# Patient Record
Sex: FEMALE | Race: BLACK OR AFRICAN AMERICAN | NOT HISPANIC OR LATINO | Employment: FULL TIME | URBAN - METROPOLITAN AREA
[De-identification: names, ages, dates, MRNs, and addresses within clinical notes are randomized per-mention and may not be internally consistent; named-entity substitution may affect disease eponyms.]

---

## 2016-06-22 LAB — HPV HIGH RISK DNA PROBE: NEGATIVE

## 2017-11-27 ENCOUNTER — OFFICE VISIT (OUTPATIENT)
Dept: FAMILY MEDICINE | Facility: CLINIC | Age: 37
End: 2017-11-27
Payer: COMMERCIAL

## 2017-11-27 VITALS
HEIGHT: 63 IN | DIASTOLIC BLOOD PRESSURE: 69 MMHG | OXYGEN SATURATION: 100 % | WEIGHT: 141 LBS | BODY MASS INDEX: 24.98 KG/M2 | TEMPERATURE: 97.9 F | HEART RATE: 87 BPM | SYSTOLIC BLOOD PRESSURE: 106 MMHG

## 2017-11-27 DIAGNOSIS — M54.41 ACUTE BILATERAL LOW BACK PAIN WITH RIGHT-SIDED SCIATICA: Primary | ICD-10-CM

## 2017-11-27 DIAGNOSIS — Z12.4 SCREENING FOR MALIGNANT NEOPLASM OF CERVIX: ICD-10-CM

## 2017-11-27 DIAGNOSIS — M51.379 DEGENERATION OF LUMBAR OR LUMBOSACRAL INTERVERTEBRAL DISC: ICD-10-CM

## 2017-11-27 PROCEDURE — 99203 OFFICE O/P NEW LOW 30 MIN: CPT | Performed by: FAMILY MEDICINE

## 2017-11-27 RX ORDER — PREDNISONE 20 MG/1
TABLET ORAL
COMMUNITY
Start: 2017-11-20 | End: 2017-11-30

## 2017-11-27 RX ORDER — GABAPENTIN 100 MG/1
100 CAPSULE ORAL 3 TIMES DAILY
COMMUNITY
Start: 2017-11-20 | End: 2018-07-26

## 2017-11-27 ASSESSMENT — PAIN SCALES - GENERAL: PAINLEVEL: NO PAIN (0)

## 2017-11-27 NOTE — Clinical Note
Please abstract the following data from this visit with this patient into the appropriate field in Epic:  Pap smear and HPV testing done on this date: 6/22/16 (exact), by this group: [facility], results were normal.

## 2017-11-27 NOTE — PATIENT INSTRUCTIONS
At [facility], we strive to deliver an exceptional experience to you, every time we see you.  If you receive a survey in the mail, please send us back your thoughts. We really do value your feedback.    Based on your medical history, these are the current health maintenance/preventive care services that you are due for (some may have been done at this visit.)  Health Maintenance Due   Topic Date Due     TETANUS IMMUNIZATION (SYSTEM ASSIGNED)  01/20/1999     PAP Q1 YR  02/15/2011     INFLUENZA VACCINE (SYSTEM ASSIGNED)  09/01/2017         Suggested websites for health information:  Www.Fifteen Reasons.Guvera : Up to date and easily searchable information on multiple topics.  Www.ViSSee.gov : medication info, interactive tutorials, watch real surgeries online  Www.familydoctor.org : good info from the Academy of Family Physicians  Www.cdc.gov : public health info, travel advisories, epidemics (H1N1)  Www.aap.org : children's health info, normal development, vaccinations  Www.health.[facility].[place].us : [place] dept of health, public health issues in [place], N1N1    Your care team:                            Family Medicine Internal Medicine   MD Darell Cooper MD Shantel Branch-Fleming, MD Katya Georgiev PA-C Nam Ho, MD Pediatrics   YVONNE Henry, MORGAN Vera APRN CNP   MD Marya Maxwell MD Deborah Mielke, MD Kim Thein, APRN CNP      Clinic hours: Monday - Thursday 7 am-7 pm; Fridays 7 am-5 pm.   Urgent care: Monday - Friday 11 am-9 pm; Saturday and Sunday 9 am-5 pm.  Pharmacy : Monday -Thursday 8 am-8 pm; Friday 8 am-6 pm; Saturday and Sunday 9 am-5 pm.     Clinic: [phone]   Pharmacy: [phone]    Understanding Lumbar Radiculopathy    Lumbar radiculopathy is irritation or inflammation of a nerve root in the low back. It causes symptoms that spread out from the back down one or both legs. To understand this condition, it helps to  understand the parts of the spine:    Vertebrae. These are bones that stack to form the spine. The lumbar spine contains the 5 bottom vertebrae.    Disks. These are soft pads of tissue between the vertebrae. They act as shock absorbers for the spine.    Spinal canal. This is a tunnel formed within the stacked vertebrae. In the lumbar spine, nerves run through this canal.    Nerves. These branch off and leave the spinal canal, traveling out to parts of the body. As they leave the spinal canal, nerves pass through openings between the vertebrae. The nerve root is the part of the nerve that is closest to the spinal canal.    Sciatic nerve. This is a large nerve formed from several nerve roots in the low back. This nerve extends down the back of the leg to the foot.  With lumbar radiculopathy, nerve roots in the low back become irritated. This leads to pain and symptoms. The sciatic nerve is commonly involved, so the condition is often called sciatica.  What causes lumbar radiculopathy?  Aging, injury, poor posture, extra body weight, and other issues can lead to problems in the low back. These problems may then irritate nerve roots. They include:    Damage to a disk in the lumbar spine. The damaged disk may then press on nearby nerve roots.    Degeneration from wear and tear, and aging. This can lead to narrowing (stenosis) of the openings between the vertebrae. The narrowed openings press on nerve roots as they leave the spinal canal.    Unstable spine. This is when a vertebra slips forward. It can then press on a nerve root.  Other, less common things can put pressure on nerves in the low back. These include diabetes, infection, or a tumor.  Symptoms of lumbar radiculopathy  These include:    Pain in the low back    Pain, numbness, tingling, or weakness that travels into the buttocks, hip, groin, or leg    Muscle spasms  Treatment for lumbar radiculopathy  In most cases, your healthcare provider will first try  treatments that help relieve symptoms. These may include:    Prescription and over-the-counter pain medicines. These help relieve pain, swelling, and irritation.    Limits on positions and activities that increase pain. But lying in bed or avoiding all movement is only recommended for a short period of time.    Physical therapy, including exercises and stretches. This helps decrease pain and increase movement and function.    Steroid shots into the lower back. This may help relieve symptoms for a time.    Weight-loss program. If you are overweight, losing extra pounds may help relieve symptoms.  In some cases, you may need surgery to fix the underlying problem. This depends on the cause, the symptoms, and how long the pain has lasted.  Possible complications  Over time, an irritated and inflamed nerve may become damaged. This may lead to long-lasting (permanent) numbness or weakness in your legs and feet. If symptoms change suddenly or get worse, be sure to let your healthcare provider know.  When to call your healthcare provider  Call your healthcare provider right away if you have any of these:    New pain or pain that gets worse    New or increasing weakness, tingling, or numbness in your leg or foot    Problems controlling your bladder or bowel   Date Last Reviewed: 3/10/2016    0368-3981 [employer]. [address] [place] [address], [place] [address]. All rights reserved. This information is not intended as a substitute for professional medical care. Always follow your healthcare professional's instructions.

## 2017-11-27 NOTE — MR AVS SNAPSHOT
After Visit Summary   11/27/2017    Jorge Mahajan    MRN: [id number]           Patient Information     Date Of Birth          1980        Visit Information        Provider Department      11/27/2017 1:40 PM Bubba Rivera MD [facility]        Today's Diagnoses     Acute bilateral low back pain with right-sided sciatica    -  1    Degeneration of lumbar or lumbosacral intervertebral disc          Care Instructions    At [facility], we strive to deliver an exceptional experience to you, every time we see you.  If you receive a survey in the mail, please send us back your thoughts. We really do value your feedback.    Based on your medical history, these are the current health maintenance/preventive care services that you are due for (some may have been done at this visit.)  Health Maintenance Due   Topic Date Due     TETANUS IMMUNIZATION (SYSTEM ASSIGNED)  01/20/1999     PAP Q1 YR  02/15/2011     INFLUENZA VACCINE (SYSTEM ASSIGNED)  09/01/2017         Suggested websites for health information:  Www.BioCision : Up to date and easily searchable information on multiple topics.  Www.medlineplus.gov : medication info, interactive tutorials, watch real surgeries online  Www.familydoctor.org : good info from the Academy of Family Physicians  Www.cdc.gov : public health info, travel advisories, epidemics (H1N1)  Www.aap.org : children's health info, normal development, vaccinations  Www.health.state[place]us : [place] dept of health, public health issues in [place], N1N1    Your care team:                            Family Medicine Internal Medicine   MD Darell Cooper MD Shantel Branch-Fleming, MD Katya Georgiev PA-C Nam Ho, MD Pediatrics   YVONNE Henry, MD Marya Thomas CNP, MD Deborah Mielke, MD Kim Thein, APRN CNP      Clinic hours: Monday - Thursday 7 am-7 pm; Fridays 7 am-5  pm.   Urgent care: Monday - Friday 11 am-9 pm; Saturday and Sunday 9 am-5 pm.  Pharmacy : Monday -Thursday 8 am-8 pm; Friday 8 am-6 pm; Saturday and Sunday 9 am-5 pm.     Clinic: [phone]   Pharmacy: [phone]            Follow-ups after your visit        Additional Services     JO PT, HAND, AND CHIROPRACTIC REFERRAL       **This order will print in the [facility] Scheduling Office**    Physical Therapy, Hand Therapy and Chiropractic Care are available through:    *[facility]  *[facility]  *[facility]    Call one number to schedule at any of the above locations: [phone].    (Go to [email or web address] for a list of locations with addresses and office hours.)    Your provider has referred you to: Physical Therapy at [facility] or [facility]    Indication/Reason for Referral: Low Back Pain with radicular pain  Onset of Illness: February 2017  Therapy Orders: Evaluate and Treat  Special Programs: None  Special Request: Equipment: As Indicated:   Special Request: Modalities: As Indicated:     Additional Comments for the Therapist or Chiropractor: L5 S1 herniated disc    Please be aware that coverage of these services is subject to the terms and limitations of your health insurance plan.  Call member services at your health plan with any benefit or coverage questions.      Please bring the following to your appointment:    *Your personal calendar for scheduling future appointments  *Comfortable clothing            ORTHO  REFERRAL       [facility] is referring you to the Orthopedic  Services at [facility].       The  Representative will assist you in the coordination of your Orthopedic and Musculoskeletal Care as prescribed by your physician.    The  Representative will call you within 1 business day to help schedule your appointment, or you may contact the  Representative  at:    All areas ~ [phone]     Type of Referral : Spine: Lumbar  **Choose Medical Spine Specialist (unless patient was seen by a Medical Spine Specialist within the past 6 months).**  Surgical Evaluation is advised if the patient presents with one or more of the following red flags: Evidence of Spinal Tumor, Infection or Fracture, Cauda Equina Syndrome, Sudden or Progressive Weakness, Loss of Bowel or Bladder Control, or any other documented emergent neurological condition resulting from a Lumbar Spinal Condition. Medical Spine Specialist        Timeframe requested: Within 2 weeks    Coverage of these services is subject to the terms and limitations of your health insurance plan.  Please call member services at your health plan with any benefit or coverage questions.      If X-rays, CT or MRI's have been performed, please contact the facility where they were done to arrange for , prior to your scheduled appointment.  Please bring this referral request to your appointment and present it to your specialist.                  Who to contact     If you have questions or need follow up information about today's clinic visit or your schedule please contact [facility] directly at [phone].  Normal or non-critical lab and imaging results will be communicated to you by [employer], letter or phone within 4 business days after the clinic has received the results. If you do not hear from us within 7 days, please contact the clinic through [employer] or phone. If you have a critical or abnormal lab result, we will notify you by phone as soon as possible.  Submit refill requests through [employer] or call your pharmacy and they will forward the refill request to us. Please allow 3 business days for your refill to be completed.          Additional Information About Your Visit        [employer] Information     [employer] lets you send messages to your doctor, view your test results, renew your prescriptions,  "schedule appointments and more. To sign up, go to [email or web address] . Click on \"Log in\" on the left side of the screen, which will take you to the Welcome page. Then click on \"Sign up Now\" on the right side of the page.     You will be asked to enter the access code listed below, as well as some personal information. Please follow the directions to create your username and password.     Your access code is: [email or web address]  Expires: 2018  2:26 PM     Your access code will  in 90 days. If you need help or a new code, please call your [place] clinic or [phone].        Care EveryWhere ID     This is your Care EveryWhere ID. This could be used by other organizations to access your [place] medical records  [id number]        Your Vitals Were     Pulse Temperature Height Pulse Oximetry BMI (Body Mass Index)       87 97.9  F (36.6  C) (Oral) 1.6 m (5' 3\") 100% 24.98 kg/m2        Blood Pressure from Last 3 Encounters:   17 106/69   02/15/10 98/60    Weight from Last 3 Encounters:   17 64 kg (141 lb)   02/15/10 68 kg (150 lb)              We Performed the Following     JO PT, HAND, AND CHIROPRACTIC REFERRAL     ORTHO  REFERRAL        Primary Care Provider Fax #    Provider Not In System [phone]                Equal Access to Services     [facility]: Hadii aad ku hadasho Soomaali, waaxda luqadaha, qaybta kaalmada adeegyada, waxay james tolbert . So [facility] [phone].    ATENCIÓN: Si habla español, tiene a lincoln disposición servicios gratuitos de asistencia lingüística. Llame al [phone].    We comply with applicable federal civil rights laws and [place] laws. We do not discriminate on the basis of race, color, national origin, age, disability, sex, sexual orientation, or gender identity.            Thank you!     Thank you for choosing [facility]  for your care. Our goal is always to provide you with excellent care. Hearing back " from our patients is one way we can continue to improve our services. Please take a few minutes to complete the written survey that you may receive in the mail after your visit with us. Thank you!             Your Updated Medication List - Protect others around you: Learn how to safely use, store and throw away your medicines at www.disposemymeds.org.          This list is accurate as of: 11/27/17  2:29 PM.  Always use your most recent med list.                   Brand Name Dispense Instructions for use Diagnosis    gabapentin 100 MG capsule    NEURONTIN     Take 100 mg by mouth 3 times daily for nerve pain (sciatica in leg)        predniSONE 20 MG tablet    DELTASONE     Take 2 tabs daily x 3 days, then 1 tab daily x 3 days, then 1/2 tab daily x 4 days.

## 2017-11-27 NOTE — PROGRESS NOTES
"  SUBJECTIVE:   Jorge Mahajan is a 37 year old female who presents to clinic today for the following health issues:      Back Pain       Duration: since February 2017, since she had a baby        Specific cause: none    Description:   Location of pain: low back middle  Character of pain: sharp  Pain radiation:radiates into the right & hip leg  New numbness or weakness in legs, not attributed to pain:  No, but she mentions sometimes she feels her leg is going to \"give up on her\"  - intermittent    Intensity: Currently 0/10    History:   Pain interferes with job: YES  History of back problems: previous herniated disc  Any previous MRI or X-rays: Yes--x-ray (August 2017) and MRI (11/20/2017) at [facility] [place] [facility].  Report visible on Care Everywhere.  Sees a specialist for back pain:  No  Therapies tried without relief: Pain medication (taking gabapentin - she thinks that this medication is causing a headache side effect, however) and prednisone (which she recently finished)    Alleviating factors:   Improved by: heat      Precipitating factors:  Worsened by: Standing    Functional and Psychosocial Screen (Alesia STarT Back):      Not performed today          Accompanying Signs & Symptoms:  Risk of Fracture:  None  Risk of Cauda Equina:  None  Risk of Infection:  None  Risk of Cancer:  None  Risk of Ankylosing Spondylitis:  Onset at age <35, male, AND morning back stiffness. no         Past medical, family, and social histories, medications, and allergies are reviewed and updated in Epic.     ROS:  C: NEGATIVE for fever, chills, change in weight  E/M: NEGATIVE for ear, mouth and throat problems  R: NEGATIVE for significant cough or SOB  CV: NEGATIVE for chest pain, palpitations or peripheral edema  ROS otherwise negative    This document serves as a record of the services and decisions personally performed and made by Dr. Rivera. It was created on his behalf by Maura Núñez, a trained medical scribe. The creation of this " "document is based the provider's statements to the medical scribe.  Maura Núñez November 27, 2017 1:58 PM     OBJECTIVE:                                                    /69 (BP Location: Left arm, Patient Position: Chair, Cuff Size: Adult Regular)  Pulse 87  Temp 97.9  F (36.6  C) (Oral)  Ht 1.6 m (5' 3\")  Wt 64 kg (141 lb)  SpO2 100%  BMI 24.98 kg/m2   Body mass index is 24.98 kg/(m^2).     GENERAL: healthy, alert and no distress  EYES: Eyes grossly normal to inspection, PERRL, EOMI, sclerae white and conjunctivae normal  MS: no gross musculoskeletal defects noted, no edema  SKIN: no suspicious lesions or rashes  NEURO: Normal strength and tone, sensory exam grossly normal, mentation intact, oriented times 3 and cranial nerves 2-12 intact  PSYCH: mentation appears normal, affect normal/bright     Diagnostic Test Results:  none      ASSESSMENT/PLAN:                                                      (M54.41) Acute bilateral low back pain with right-sided sciatica  (primary encounter diagnosis)  (M51.37) Degeneration of lumbar or lumbosacral intervertebral disc  Comment: S1 radicular symptoms, described as right-sided although her MRI is more abnormal on the left side.   Plan: JO PT, HAND, AND CHIROPRACTIC REFERRAL, ORTHO          REFERRAL        Handouts provided. I instructed her to take gabapentin 300 mg QHS rather than 100 mg BID or TID.        The information in this document, created by the medical scribe for me, accurately reflects the services I personally performed and the decisions made by me. I have reviewed and approved this document for accuracy prior to leaving the patient care area. November 27, 2017 1:58 PM   Bubba Rivera MD     "

## 2017-11-27 NOTE — NURSING NOTE
"Chief Complaint   Patient presents with     Back Pain       Initial /69 (BP Location: Left arm, Patient Position: Chair, Cuff Size: Adult Regular)  Pulse 87  Temp 97.9  F (36.6  C) (Oral)  Ht 5' 3\" (1.6 m)  Wt 141 lb (64 kg)  SpO2 100%  BMI 24.98 kg/m2 Estimated body mass index is 24.98 kg/(m^2) as calculated from the following:    Height as of this encounter: 5' 3\" (1.6 m).    Weight as of this encounter: 141 lb (64 kg).  Medication Reconciliation: complete   CHRISTOPH Mascorro MA      "

## 2017-11-30 ENCOUNTER — THERAPY VISIT (OUTPATIENT)
Dept: PHYSICAL THERAPY | Facility: CLINIC | Age: 37
End: 2017-11-30
Payer: COMMERCIAL

## 2017-11-30 DIAGNOSIS — M54.41 ACUTE LOW BACK PAIN WITH RIGHT-SIDED SCIATICA: Primary | ICD-10-CM

## 2017-11-30 DIAGNOSIS — M51.379 DEGENERATION OF LUMBAR OR LUMBOSACRAL INTERVERTEBRAL DISC: ICD-10-CM

## 2017-11-30 PROCEDURE — 97112 NEUROMUSCULAR REEDUCATION: CPT | Mod: GP | Performed by: PHYSICAL THERAPIST

## 2017-11-30 PROCEDURE — 97110 THERAPEUTIC EXERCISES: CPT | Mod: GP | Performed by: PHYSICAL THERAPIST

## 2017-11-30 PROCEDURE — 97161 PT EVAL LOW COMPLEX 20 MIN: CPT | Mod: GP | Performed by: PHYSICAL THERAPIST

## 2017-11-30 NOTE — MR AVS SNAPSHOT
"              After Visit Summary   11/30/2017    Jorge Mahajan    MRN: [id number]           Patient Information     Date Of Birth          1980        Visit Information        Provider Department      11/30/2017 5:20 PM Chevy Levi PT [facility]        Today's Diagnoses     Acute low back pain with right-sided sciatica    -  1    Degeneration of lumbar or lumbosacral intervertebral disc           Follow-ups after your visit        Your next 10 appointments already scheduled     Dec 11, 2017 11:00 AM CST   MEDSPINE NEW with Jaren Farrar,    [facility] ([facility])    [address] [address] [place] [id number] [phone]              Who to contact     If you have questions or need follow up information about today's clinic visit or your schedule please contact [place] [facility] directly at [phone].  Normal or non-critical lab and imaging results will be communicated to you by MyChart, letter or phone within 4 business days after the clinic has received the results. If you do not hear from us within 7 days, please contact the clinic through [employer] or phone. If you have a critical or abnormal lab result, we will notify you by phone as soon as possible.  Submit refill requests through [employer] or call your pharmacy and they will forward the refill request to us. Please allow 3 business days for your refill to be completed.          Additional Information About Your Visit        [employer] Information     [facility] lets you send messages to your doctor, view your test results, renew your prescriptions, schedule appointments and more. To sign up, go to [email or web address] . Click on \"Log in\" on the left side of the screen, which will take you to the Welcome page. Then click on \"Sign up Now\" on the right side of the page.     You will be asked to enter the access code listed below, as well as " some personal information. Please follow the directions to create your username and password.     Your access code is: [email or web address]  Expires: 2018  2:26 PM     Your access code will  in 90 days. If you need help or a new code, please call your [place] clinic or [phone].        Care EveryWhere ID     This is your Care EveryWhere ID. This could be used by other organizations to access your [place] medical records  [id number]         Blood Pressure from Last 3 Encounters:   17 106/69   02/15/10 98/60    Weight from Last 3 Encounters:   17 64 kg (141 lb)   02/15/10 68 kg (150 lb)              We Performed the Following     JO Inital Eval Report     Neuromuscular Re-Education     PT Eval, Low Complexity ([id number])     Therapeutic Exercises        Primary Care Provider Fax #    Provider Not In System [phone]                Equal Access to Services     [facility]: Hadii aad ku hadasho Solisa, waaxda luqadaha, qaybta kaalmada adeegyada, waxay james tolbert . So [facility] [phone].    ATENCIÓN: Si habla español, tiene a lincoln disposición servicios gratuitos de asistencia lingüística. Llame al [phone].    We comply with applicable federal civil rights laws and [place] laws. We do not discriminate on the basis of race, color, national origin, age, disability, sex, sexual orientation, or gender identity.            Thank you!     Thank you for choosing [facility]  for your care. Our goal is always to provide you with excellent care. Hearing back from our patients is one way we can continue to improve our services. Please take a few minutes to complete the written survey that you may receive in the mail after your visit with us. Thank you!             Your Updated Medication List - Protect others around you: Learn how to safely use, store and throw away your medicines at www.disposemymeds.org.          This list is accurate as of:  11/30/17  6:47 PM.  Always use your most recent med list.                   Brand Name Dispense Instructions for use Diagnosis    gabapentin 100 MG capsule    NEURONTIN     Take 100 mg by mouth 3 times daily for nerve pain (sciatica in leg)        predniSONE 20 MG tablet    DELTASONE     Take 2 tabs daily x 3 days, then 1 tab daily x 3 days, then 1/2 tab daily x 4 days.

## 2017-11-30 NOTE — PROGRESS NOTES
[facility] Initial Evaluation      Subjective:    Patient is a 37 year old female presenting with rehab back hpi.   Jorge Mahajan is a 37 year old female with a lumbar condition.  Condition occurred with:  Insidious onset.  Condition occurred: for unknown reasons.  This is a chronic and recurrent condition  February 2017.    Patient reports pain:  Lumbar spine right and lumbar spine left.  Radiates to:  Gluteals right, gluteals left and thigh right.  Pain is described as stabbing and is intermittent and reported as 10/10.  Associated symptoms:  Loss of strength. Pain is the same all the time.  Symptoms are exacerbated by bending, standing and sitting and relieved by heat and analgesics (walking).  Since onset symptoms are gradually worsening.  Special tests:  MRI and x-ray (lumbar HNP).  Previous treatment: NONE.    General health as reported by patient is excellent.  Pertinent medical history includes:  None.  Medical allergies: no.  Other surgeries include:  Other.  Current medications:  Pain medication.  Current occupation is NURSING ASSIST. .  Patient is working in normal job without restrictions.  Primary job tasks include:  Prolonged standing and lifting.    Barriers include:  None as reported by the patient.    Red flags:  None as reported by the patient.                        Objective:    System         Lumbar/SI Evaluation    Lumbar Myotomes:    T12-L3 (Hip Flex):  Left: 5    Right: 5  L2-4 (Quads):  Left:  5    Right:  5  L4 (Ankle DF):  Left:  5    Right:  5  L5 (Great Toe Ext): Left: 5    Right: 5   S1 (Toe Raise):  Left: 5    Right: 4+  Lumbar DTR's:    L4 (Quad):  Left:  1   Right:  1  S1 (Achilles):  Left:  0   Right:  0                                                               [place] Lumbar Evaluation    Posture:  Sitting: fair        Correction of Posture: better  Other Observations: EIS AND DEANNA: NO PAIN.   Movement Loss:  Flexion (Flex): pain  Extension (EXT):  nil  Side Glide R (SG R): nil  Side Glide L (SG L): nil  Test Movements:  FIS: During: increases                  Conclusion: derangement  Principle of Treatment:  Posture Correction: IN SITTING WITH TOWEL ROLL    Extension: EIS       Other: NEUTRAL SPINE, THER EX, THER ACT, NMR, MANUAL THERAPY.                                        ROS    Assessment/Plan:      Patient is a 37 year old female with lumbar complaints.    Patient has the following significant findings with corresponding treatment plan.                Diagnosis 1:  ACUTE LOW BACK PAIN with R SCIATICA,  DEGENERATION OF LUMBAR OR LUMBOSACRAL INTERVERTEBRAL DISC.   Pain -  hot/cold therapy, US, electric stimulation, manual therapy, splint/taping/bracing/orthotics, self management, education, directional preference exercise and home program  Decreased function - therapeutic activities and home program  Impaired posture - neuro re-education, therapeutic activities and home program    Therapy Evaluation Codes:   1) History comprised of:   Personal factors that impact the plan of care:      Past/current experiences.    Comorbidity factors that impact the plan of care are:      None.     Medications impacting care: None.  2) Examination of Body Systems comprised of:   Body structures and functions that impact the plan of care:      Lumbar spine.   Activity limitations that impact the plan of care are:      Bathing, Bending, Driving, Dressing, Lifting, Sitting, Squatting/kneeling, Stairs, Standing and Working.  3) Clinical presentation characteristics are:   Stable/Uncomplicated.  4) Decision-Making    Low complexity using standardized patient assessment instrument and/or measureable assessment of functional outcome.  Cumulative Therapy Evaluation is: Low complexity.    Previous and current functional limitations:  (See Goal Flow Sheet for this information)    Short term and Long term goals: (See Goal Flow Sheet for this information)     Communication ability:   Patient appears to be able to clearly communicate and understand verbal and written communication and follow directions correctly.  Treatment Explanation - The following has been discussed with the patient:   RX ordered/plan of care  Anticipated outcomes  Possible risks and side effects  This patient would benefit from PT intervention to resume normal activities.   Rehab potential is good.    Frequency:  1 X week, once daily  Duration:  for 6 weeks  Discharge Plan:  Achieve all LTG.  Independent in home treatment program.  Reach maximal therapeutic benefit.    Please refer to the daily flowsheet for treatment today, total treatment time and time spent performing 1:1 timed codes.

## 2017-12-11 ENCOUNTER — OFFICE VISIT (OUTPATIENT)
Dept: ORTHOPEDICS | Facility: CLINIC | Age: 37
End: 2017-12-11
Payer: COMMERCIAL

## 2017-12-11 VITALS
HEIGHT: 63 IN | HEART RATE: 85 BPM | OXYGEN SATURATION: 98 % | WEIGHT: 147.6 LBS | SYSTOLIC BLOOD PRESSURE: 98 MMHG | BODY MASS INDEX: 26.15 KG/M2 | DIASTOLIC BLOOD PRESSURE: 69 MMHG

## 2017-12-11 DIAGNOSIS — M54.16 LUMBAR RADICULOPATHY: Primary | ICD-10-CM

## 2017-12-11 PROCEDURE — 99213 OFFICE O/P EST LOW 20 MIN: CPT | Performed by: FAMILY MEDICINE

## 2017-12-11 RX ORDER — GABAPENTIN 100 MG/1
100 CAPSULE ORAL 3 TIMES DAILY
Qty: 90 CAPSULE | Refills: 1 | Status: SHIPPED | OUTPATIENT
Start: 2017-12-11 | End: 2018-07-26

## 2017-12-11 RX ORDER — GABAPENTIN 300 MG/1
300 CAPSULE ORAL 3 TIMES DAILY
Qty: 90 CAPSULE | Refills: 1 | Status: SHIPPED | OUTPATIENT
Start: 2017-12-11 | End: 2018-02-02

## 2017-12-11 ASSESSMENT — PAIN SCALES - GENERAL: PAINLEVEL: NO PAIN (0)

## 2017-12-11 NOTE — PATIENT INSTRUCTIONS
Thanks for coming today.  Ortho/Sports Medicine Clinic  [address] [place] [address]    To schedule future appointments in Ortho Clinic, you may call [phone].    To schedule ordered imaging by your provider:   Call Central Imaging [phone]    To schedule an injection ordered by your provider:  Call Central Imaging Injection scheduling line: [phone]  OmnyPayhart available online at:  [email or web address]    Please call if any further questions or concerns ([phone]).  Clinic hours 8 am to 5 pm.    Return to clinic (call) if symptoms worsen or fail to improve.

## 2017-12-11 NOTE — MR AVS SNAPSHOT
After Visit Summary   2017    Jorge Mahajan    MRN: [id number]           Patient Information     Date Of Birth          1980        Visit Information        Provider Department      2017 11:00 AM Jaren Farrar, DO [facility]        Today's Diagnoses     Lumbar radiculopathy    -  1      Care Instructions    Thanks for coming today.  Ortho[facility]Sports Medicine Clinic  [address] [place] [address]    To schedule future appointments in Ortho Clinic, you may call [phone].    To schedule ordered imaging by your provider:   Call Central Imaging [phone]    To schedule an injection ordered by your provider:  Call Central Imaging Injection scheduling line: [phone]  [employer] available online at:  [email or web address]    Please call if any further questions or concerns ([phone]).  Clinic hours 8 am to 5 pm.    Return to clinic (call) if symptoms worsen or fail to improve.            Follow-ups after your visit        Who to contact     If you have questions or need follow up information about today's clinic visit or your schedule please contact [facility] directly at [phone].  Normal or non-critical lab and imaging results will be communicated to you by Eventbritehart, letter or phone within 4 business days after the clinic has received the results. If you do not hear from us within 7 days, please contact the clinic through Eventbritehart or phone. If you have a critical or abnormal lab result, we will notify you by phone as soon as possible.  Submit refill requests through [employer] or call your pharmacy and they will forward the refill request to us. Please allow 3 business days for your refill to be completed.          Additional Information About Your Visit        MyCharEnStorage Information     [employer] is an electronic gateway that provides easy, online access to your medical records. With [employer], you can request a clinic  "appointment, read your test results, renew a prescription or communicate with your care team.     To sign up for [employer] visit the website at [email or web address]   You will be asked to enter the access code listed below, as well as some personal information. Please follow the directions to create your username and password.     Your access code is: [email or web address]  Expires: 2018  2:26 PM     Your access code will  in 90 days. If you need help or a new code, please contact your [facility] or call [phone] for assistance.        Care EveryWhere ID     This is your Care EveryWhere ID. This could be used by other organizations to access your [facility] medical records  [id number]        Your Vitals Were     Pulse Height Pulse Oximetry BMI (Body Mass Index)          85 1.6 m (5' 2.99\") 98% 26.15 kg/m2         Blood Pressure from Last 3 Encounters:   17 98/69   17 106/69   02/15/10 98/60    Weight from Last 3 Encounters:   17 67 kg (147 lb 9.6 oz)   17 64 kg (141 lb)   02/15/10 68 kg (150 lb)              Today, you had the following     No orders found for display         Today's Medication Changes          These changes are accurate as of: 17 11:53 AM.  If you have any questions, ask your nurse or doctor.               These medicines have changed or have updated prescriptions.        Dose/Directions    * gabapentin 100 MG capsule   Commonly known as:  NEURONTIN   This may have changed:  Another medication with the same name was added. Make sure you understand how and when to take each.   Changed by:  Bubba Rivera MD        Dose:  100 mg   Take 100 mg by mouth 3 times daily for nerve pain (sciatica in leg)   Refills:  0       * gabapentin 300 MG capsule   Commonly known as:  NEURONTIN   This may have changed:  You were already taking a medication with the same name, and this prescription was added. Make sure you understand how and " when to take each.   Used for:  Lumbar radiculopathy   Changed by:  Jaren Farrar DO        Dose:  300 mg   Take 1 capsule (300 mg) by mouth 3 times daily   Quantity:  90 capsule   Refills:  1       * gabapentin 100 MG capsule   Commonly known as:  NEURONTIN   This may have changed:  You were already taking a medication with the same name, and this prescription was added. Make sure you understand how and when to take each.   Used for:  Lumbar radiculopathy   Changed by:  Jaren Farrar DO        Dose:  100 mg   Take 1 capsule (100 mg) by mouth 3 times daily   Quantity:  90 capsule   Refills:  1       * Notice:  This list has 3 medication(s) that are the same as other medications prescribed for you. Read the directions carefully, and ask your doctor or other care provider to review them with you.         Where to get your medicines      These medications were sent to [facility] [id number] [place] [address] [address] [place] [address]  [address] [place] [phone]    Hours:  24-hours Phone:  [phone]     gabapentin 100 MG capsule    gabapentin 300 MG capsule                Primary Care Provider Fax #    Provider Not In System [phone]                Equal Access to Services     ANDREA BARAHONA : Frida lundbergo Solisa, waaxda luqadaha, qaybta kaalmada adeegyada, anali lopez. So [facility] [place] [phone].    ATENCIÓN: Si habla español, tiene a lincoln disposición servicios gratuitos de asistencia lingüística. Llame al [phone].    We comply with applicable federal civil rights laws and [place] laws. We do not discriminate on the basis of race, color, national origin, age, disability, sex, sexual orientation, or gender identity.            Thank you!     Thank you for choosing [facility]  for your care. Our goal is always to provide you with excellent care. Hearing back from our patients is one way we can continue  to improve our services. Please take a few minutes to complete the written survey that you may receive in the mail after your visit with us. Thank you!             Your Updated Medication List - Protect others around you: Learn how to safely use, store and throw away your medicines at www.disposemymeds.org.          This list is accurate as of: 12/11/17 11:53 AM.  Always use your most recent med list.                   Brand Name Dispense Instructions for use Diagnosis    * gabapentin 100 MG capsule    NEURONTIN     Take 100 mg by mouth 3 times daily for nerve pain (sciatica in leg)        * gabapentin 300 MG capsule    NEURONTIN    90 capsule    Take 1 capsule (300 mg) by mouth 3 times daily    Lumbar radiculopathy       * gabapentin 100 MG capsule    NEURONTIN    90 capsule    Take 1 capsule (100 mg) by mouth 3 times daily    Lumbar radiculopathy       * Notice:  This list has 3 medication(s) that are the same as other medications prescribed for you. Read the directions carefully, and ask your doctor or other care provider to review them with you.

## 2017-12-11 NOTE — NURSING NOTE
"Jorge Mahajan's goals for this visit include: evaluate lower back pain   She requests these members of her care team be copied on today's visit information: no    PCP: System, Provider Not In    Referring Provider:  No referring provider defined for this encounter.    Chief Complaint   Patient presents with     Consult     lower back pain X 8 months        Initial Pulse 85  Ht 1.6 m (5' 2.99\")  Wt 67 kg (147 lb 9.6 oz)  SpO2 98%  BMI 26.15 kg/m2 Estimated body mass index is 26.15 kg/(m^2) as calculated from the following:    Height as of this encounter: 1.6 m (5' 2.99\").    Weight as of this encounter: 67 kg (147 lb 9.6 oz).  Medication Reconciliation: complete    "

## 2017-12-11 NOTE — PROGRESS NOTES
CHIEF COMPLAINT:  Consult (lower back pain X 8 months )       HISTORY OF PRESENT ILLNESS  Ms. Mahajan is a pleasant 37 year old year old female who presents to clinic today with low back pain.  Jorge is seen at the request of Dr. Rivera.    Jorge is experiencing a pain in her low back this started shortly after the birth of her most recent child.  She had an uncomplicated  delivery.  Pain starts near the center of her low back, radiates down the right side of her low back down her right leg.  She does note some numbness and tingling associated with the pain.  She was seen at [facility] and had imaging of her lumbar spine, she was prescribed physical therapy which she just started.  She is feeling slightly better after PT.    She does have a history of low back pain, this was years ago and did not include radicular features.  This resolved spontaneously.      Additional history: as documented    MEDICAL HISTORY  Patient Active Problem List   Diagnosis     Cervical cancer screening     Acute low back pain with right-sided sciatica     Degeneration of lumbar or lumbosacral intervertebral disc       Current Outpatient Prescriptions   Medication Sig Dispense Refill     gabapentin (NEURONTIN) 100 MG capsule Take 100 mg by mouth 3 times daily for nerve pain (sciatica in leg)         No Known Allergies    Family History   Problem Relation Age of Onset     DIABETES Father      TYPE II     Family History Negative Other        Additional medical/Social/Surgical histories reviewed in [facility] and updated as appropriate.     REVIEW OF SYSTEMS (2017)  CONSTITUTIONAL: Denies fever and weight loss  EYES: Denies acute vision changes  ENT: Denies hearing changes or difficulty swallowing  CARDIAC: Denies chest pain or edema  RESPIRATORY: Denies dyspnea, cough or wheeze  GASTROINTESTINAL: Denies abdominal pain, nausea, vomiting  MUSCULOSKELETAL: See HPI  SKIN: Denies any recent rash or lesion  NEUROLOGICAL: Denies  "numbness or focal weakness  PSYCHIATRIC: No history of psychiatric symptoms or problems  HEMATOLOGY: Denies episodes of easy bleeding      PHYSICAL EXAM  Vitals:    12/11/17 1114   BP: 98/69   Pulse: 85   SpO2: 98%   Weight: 67 kg (147 lb 9.6 oz)   Height: 1.6 m (5' 2.99\")     General  - normal appearance, in no obvious distress  CV  - normal peripheral perfusion  Pulm  - normal respiratory pattern, non-labored  Musculoskeletal - lumbar spine  - stance: normal gait without limp, no obvious leg length discrepancy, normal heel and toe walk  - inspection: normal bone and joint alignment, no obvious scoliosis  - palpation: no paravertebral or bony tenderness  - ROM: normal flexion, extension, sidebending, rotation  - strength: lower extremities 5/5 in all planes  - special tests:  (+) slump test on R  Neuro  - patellar and Achilles DTRs 2+ bilaterally, grossly normal coordination, normal muscle tone  Skin  - no ecchymosis, erythema, warmth, or induration, no obvious rash  Psych  - interactive, appropriate, normal mood and affect         ASSESSMENT & PLAN  Ms. Mahajan is a 37 year old year old female who presents to clinic today with low back pain with radicular features.    The MRI that she had done a few weeks ago was pushed to our system, we reviewed the images in the room.  This does show an isolated L5 central disc protrusion with bilateral neural foraminal stenosis and a potential left-sided S1 nerve root impingement.    Jorge and I had a good discussion regarding non-operative treatment for degenerative disc disease.  This included strengthening of the paraspinal and core muscles, weight control, and oral analgesics when needed.  We also discussed the role of epidural corticosteroid injections.    Given her relative improvement with physical therapy I do think it is reasonable to continue PT.  I am hoping for a decent amount of improvement over the next 4-6 weeks.  We should follow-up at that time, if worsening or " if still painful we could consider referring her for a lumbar injection.    Thank you for allowing me to participate in Dena's care.    Jaren Farrar DO, CAQSM  Primary Care Sports Medicine

## 2018-01-05 ENCOUNTER — THERAPY VISIT (OUTPATIENT)
Dept: PHYSICAL THERAPY | Facility: CLINIC | Age: 38
End: 2018-01-05
Payer: COMMERCIAL

## 2018-01-05 DIAGNOSIS — M51.379 DEGENERATION OF LUMBAR OR LUMBOSACRAL INTERVERTEBRAL DISC: ICD-10-CM

## 2018-01-05 DIAGNOSIS — M54.41 ACUTE LOW BACK PAIN WITH RIGHT-SIDED SCIATICA: ICD-10-CM

## 2018-01-05 PROCEDURE — 97530 THERAPEUTIC ACTIVITIES: CPT | Mod: GP | Performed by: PHYSICAL THERAPIST

## 2018-01-05 PROCEDURE — 97110 THERAPEUTIC EXERCISES: CPT | Mod: GP | Performed by: PHYSICAL THERAPIST

## 2018-01-05 PROCEDURE — 97112 NEUROMUSCULAR REEDUCATION: CPT | Mod: GP | Performed by: PHYSICAL THERAPIST

## 2018-01-05 NOTE — MR AVS SNAPSHOT
"              After Visit Summary   1/5/2018    Jorge Mahajan    MRN: [id number]           Patient Information     Date Of Birth          1980        Visit Information        Provider Department      1/5/2018 7:00 AM Chevy Levi, PT [facility]        Today's Diagnoses     Acute low back pain with right-sided sciatica        Degeneration of lumbar or lumbosacral intervertebral disc           Follow-ups after your visit        Your next 10 appointments already scheduled     Jan 08, 2018  4:20 PM [facility] with Chevy Levi PT   [facility] ([facility]  )    [address]  [facility] [id number] [phone]              Who to contact     If you have questions or need follow up information about today's clinic visit or your schedule please contact [facility] directly at [phone].  Normal or non-critical lab and imaging results will be communicated to you by DocuSpeakhart, letter or phone within 4 business days after the clinic has received the results. If you do not hear from us within 7 days, please contact the clinic through DocuSpeakhart or phone. If you have a critical or abnormal lab result, we will notify you by phone as soon as possible.  Submit refill requests through Inporia or call your pharmacy and they will forward the refill request to us. Please allow 3 business days for your refill to be completed.          Additional Information About Your Visit        DocuSpeakhart Information     Inporia lets you send messages to your doctor, view your test results, renew your prescriptions, schedule appointments and more. To sign up, go to [email or web address] . Click on \"Log in\" on the left side of the screen, which will take you to the Welcome page. Then click on \"Sign up Now\" on the right side of the page.     You will be asked to enter the access code listed below, as well as some " personal information. Please follow the directions to create your username and password.     Your access code is: [email or web address]  Expires: 2018  2:26 PM     Your access code will  in 90 days. If you need help or a new code, please call your [facility] clinic or [phone].        Care EveryWhere ID     This is your Care EveryWhere ID. This could be used by other organizations to access your [facility] medical records  [id number]         Blood Pressure from Last 3 Encounters:   17 98/69   17 106/69   02/15/10 98/60    Weight from Last 3 Encounters:   17 67 kg (147 lb 9.6 oz)   17 64 kg (141 lb)   02/15/10 68 kg (150 lb)              We Performed the Following     JO Progress Notes Report     Neuromuscular Re-Education     Therapeutic Activities     Therapeutic Exercises        Primary Care Provider Fax #    Provider Not In System [phone]                Equal Access to Services     ANDREA BARAHONA : Hadii aad ku hadasho Soomaali, waaxda luqadaha, qaybta kaalmada adeegyada, waxay jeniferin fidencio tolbert . So [facility] [phone].    ATENCIÓN: Si kelsi esppatricia, tiene a lincoln disposición servicios gratuitos de asistencia lingüística. Llame al [phone].    We comply with applicable federal civil rights laws and [place] laws. We do not discriminate on the basis of race, color, national origin, age, disability, sex, sexual orientation, or gender identity.            Thank you!     Thank you for choosing [facility]  for your care. Our goal is always to provide you with excellent care. Hearing back from our patients is one way we can continue to improve our services. Please take a few minutes to complete the written survey that you may receive in the mail after your visit with us. Thank you!             Your Updated Medication List - Protect others around you: Learn how to safely use, store and throw away your medicines at www.disposemymeds.org.           This list is accurate as of: 1/5/18  6:43 PM.  Always use your most recent med list.                   Brand Name Dispense Instructions for use Diagnosis    * gabapentin 100 MG capsule    NEURONTIN     Take 100 mg by mouth 3 times daily for nerve pain (sciatica in leg)        * gabapentin 300 MG capsule    NEURONTIN    90 capsule    Take 1 capsule (300 mg) by mouth 3 times daily    Lumbar radiculopathy       * gabapentin 100 MG capsule    NEURONTIN    90 capsule    Take 1 capsule (100 mg) by mouth 3 times daily    Lumbar radiculopathy       * Notice:  This list has 3 medication(s) that are the same as other medications prescribed for you. Read the directions carefully, and ask your doctor or other care provider to review them with you.

## 2018-01-06 NOTE — PROGRESS NOTES
Subjective:  HPI                    Objective:  System    Physical Exam    General     ROS    Assessment/Plan:    PROGRESS  REPORT    Progress reporting period is from 11/30/2017 to 1/5/2018.     SUBJECTIVE  Subjective: NO LBP.  HAS INTERMITTENT L L/E DISCOMFORT TO PLANTAR L FOOT. .    Current Pain level: 0/10   Initial Pain level: 10/10        ;   ,     The subjective and objective information are from the last SOAP note on this patient.    OBJECTIVE  Objective: FIS: TO MID LEG PAIN FREE.    EIS: FULL PAIN FREE.       ASSESSMENT/PLAN  Updated problem list and treatment plan: Diagnosis 1:  ACUTE LOW BACK PAIN WITH R SCIATICA  Pain -  hot/cold therapy, US, electric stimulation, manual therapy, splint/taping/bracing/orthotics, self management, education, directional preference exercise and home program  Decreased function - therapeutic activities, home program and functional performance testing  Impaired posture - neuro re-education, therapeutic activities and home program  STG/LTGs have been met or progress has been made towards goals:  Yes (See Goal flow sheet completed today.)  Assessment of Progress: The patient's condition is improving.  Self Management Plans:  Patient has been instructed in a home treatment program.  I have re-evaluated this patient and find that the nature, scope, duration and intensity of the therapy is appropriate for the medical condition of the patient.  Jorge continues to require the following intervention to meet STG and LTG's: PT      Recommendations:  This patient would benefit from continued therapy.     Frequency:  1 X week, once daily  Duration:  for 6 weeks          Please refer to the daily flowsheet for treatment today, total treatment time and time spent performing 1:1 timed codes.

## 2018-01-08 ENCOUNTER — THERAPY VISIT (OUTPATIENT)
Dept: PHYSICAL THERAPY | Facility: CLINIC | Age: 38
End: 2018-01-08
Payer: COMMERCIAL

## 2018-01-08 DIAGNOSIS — M54.41 ACUTE LOW BACK PAIN WITH RIGHT-SIDED SCIATICA: ICD-10-CM

## 2018-01-08 DIAGNOSIS — M51.379 DEGENERATION OF LUMBAR OR LUMBOSACRAL INTERVERTEBRAL DISC: ICD-10-CM

## 2018-01-08 PROCEDURE — 97112 NEUROMUSCULAR REEDUCATION: CPT | Mod: GP | Performed by: PHYSICAL THERAPIST

## 2018-01-08 PROCEDURE — 97140 MANUAL THERAPY 1/> REGIONS: CPT | Mod: GP | Performed by: PHYSICAL THERAPIST

## 2018-01-08 PROCEDURE — 97035 APP MDLTY 1+ULTRASOUND EA 15: CPT | Mod: GP | Performed by: PHYSICAL THERAPIST

## 2018-02-02 DIAGNOSIS — M54.16 LUMBAR RADICULOPATHY: ICD-10-CM

## 2018-02-02 RX ORDER — GABAPENTIN 300 MG/1
300 CAPSULE ORAL 3 TIMES DAILY
Qty: 90 CAPSULE | Refills: 1 | Status: SHIPPED | OUTPATIENT
Start: 2018-02-02 | End: 2018-05-17

## 2018-02-02 NOTE — TELEPHONE ENCOUNTER
gabapentin (NEURONTIN) 300 MG capsule 90 capsule 1 12/11/2017  --   Sig: Take 1 capsule (300 mg) by mouth 3 times daily   Class: E-Prescribe   Route: Oral   Order: [id number]   E-Prescribing Status: Receipt confirmed by pharmacy (12/11/2017 11:53 AM CST)

## 2018-05-17 DIAGNOSIS — M54.16 LUMBAR RADICULOPATHY: ICD-10-CM

## 2018-05-17 NOTE — TELEPHONE ENCOUNTER
gabapentin (NEURONTIN) 300 MG capsule 90 capsule 1 2/2/2018       Last Written Prescription Date:  02/02/2018  Last Fill Quantity: 90,   # refills: 1  Last Office Visit: 12/1/2017  Future Office visit:

## 2018-05-22 RX ORDER — GABAPENTIN 300 MG/1
300 CAPSULE ORAL 3 TIMES DAILY
Qty: 90 CAPSULE | Refills: 1 | Status: SHIPPED | OUTPATIENT
Start: 2018-05-22 | End: 2018-08-14

## 2018-07-26 ENCOUNTER — OFFICE VISIT (OUTPATIENT)
Dept: FAMILY MEDICINE | Facility: CLINIC | Age: 38
End: 2018-07-26
Payer: COMMERCIAL

## 2018-07-26 VITALS
HEIGHT: 63 IN | SYSTOLIC BLOOD PRESSURE: 107 MMHG | TEMPERATURE: 96 F | OXYGEN SATURATION: 100 % | WEIGHT: 139.6 LBS | BODY MASS INDEX: 24.73 KG/M2 | HEART RATE: 90 BPM | DIASTOLIC BLOOD PRESSURE: 70 MMHG

## 2018-07-26 DIAGNOSIS — R21 RASH AND NONSPECIFIC SKIN ERUPTION: Primary | ICD-10-CM

## 2018-07-26 LAB
KOH PREP SPEC: NORMAL
SPECIMEN SOURCE: NORMAL

## 2018-07-26 PROCEDURE — 87220 TISSUE EXAM FOR FUNGI: CPT | Performed by: NURSE PRACTITIONER

## 2018-07-26 PROCEDURE — 99213 OFFICE O/P EST LOW 20 MIN: CPT | Performed by: NURSE PRACTITIONER

## 2018-07-26 RX ORDER — PREDNISONE 20 MG/1
40 TABLET ORAL DAILY
Qty: 10 TABLET | Refills: 0 | Status: SHIPPED | OUTPATIENT
Start: 2018-07-26 | End: 2018-07-31

## 2018-07-26 RX ORDER — TRIAMCINOLONE ACETONIDE 5 MG/G
CREAM TOPICAL
Refills: 0 | COMMUNITY
Start: 2018-07-23

## 2018-07-26 NOTE — PATIENT INSTRUCTIONS
At [facility], we strive to deliver an exceptional experience to you, every time we see you.  If you receive a survey in the mail, please send us back your thoughts. We really do value your feedback.    Based on your medical history, these are the current health maintenance/preventive care services that you are due for (some may have been done at this visit.)  Health Maintenance Due   Topic Date Due     PHQ-2 Q1 YR  01/20/1993     TETANUS IMMUNIZATION (SYSTEM ASSIGNED)  01/20/1999     HIV SCREEN (SYSTEM ASSIGNED)  01/20/1999       Suggested websites for health information:  Www.[facility].access.[employer] : Up to date and easily searchable information on multiple topics.  Www.medlineplus.gov : medication info, interactive tutorials, watch real surgeries online  Www.familydoctor.org : good info from the Academy of Family Physicians  Www.cdc.gov : public health info, travel advisories, epidemics (H1N1)  Www.aap.org : children's health info, normal development, vaccinations  Www.health.[facility].[place].us : [place] dept of health, public health issues in [place], N1N1    Your care team:                            Family Medicine Internal Medicine   MD Darell Cooper MD Shantel Branch-Fleming, MD Katya Georgiev PA-C Megan Hill, APRN MORGAN Hope MD Pediatrics   Pa Manznaares PAELIANA Oneal, MD Angélica Tomas APRN CNP   MD Marya Maxwell MD Deborah Mielke, MD Kim Thein, APRN [facility]      Clinic hours: Monday - Thursday 7 am-7 pm; Fridays 7 am-5 pm.   Urgent care: Monday - Friday 11 am-9 pm; Saturday and Sunday 9 am-5 pm.  Pharmacy : Monday -Thursday 8 am-8 pm; Friday 8 am-6 pm; Saturday and Sunday 9 am-5 pm.     Clinic: [phone]   Pharmacy: [phone]    Please call or return to the clinic for new or worsening symptoms.

## 2018-07-26 NOTE — PROGRESS NOTES
SUBJECTIVE:   Jorge Mahajan is a 38 year old female who presents to clinic today for the following health issues:    Rash      Duration: Since Friday    Description  Location: neck and chest  Itching: mild    Intensity:  moderate    Accompanying signs and symptoms: painful    History (similar episodes/previous evaluation): None    Precipitating or alleviating factors:  New exposures:  None  Recent travel: no      Therapies tried and outcome: topical steroid - Triamcinolone cream    Patient was seen by UC at [facility] on Friday and was given Triamcinolone cream to use on lesions to her right hip.  Since that time her rash has spread from her right hip to her right groin, rash is on her entire right flank and on upper chest.  Rash is erythematous, macolopapular, mildly pruritic.  Triamcinolone cream has not helped.      Problem list and histories reviewed & adjusted, as indicated.  Additional history: as documented    Patient Active Problem List   Diagnosis     Cervical cancer screening     Acute low back pain with right-sided sciatica     Degeneration of lumbar or lumbosacral intervertebral disc     Past Surgical History:   Procedure Laterality Date     C/SECTION, LOW TRANSVERSE  02/03/2017     C/SECTION, LOW TRANSVERSE  01/27/2009     C/SECTION, LOW TRANSVERSE  03/15/2012       Social History   Substance Use Topics     Smoking status: Never Smoker     Smokeless tobacco: Never Used     Alcohol use No     Family History   Problem Relation Age of Onset     Diabetes Father      TYPE II     Family History Negative Other          Current Outpatient Prescriptions   Medication Sig Dispense Refill     gabapentin (NEURONTIN) 300 MG capsule Take 1 capsule (300 mg) by mouth 3 times daily 90 capsule 1     predniSONE (DELTASONE) 20 MG tablet Take 2 tablets (40 mg) by mouth daily for 5 days 10 tablet 0     triamcinolone (KENALOG) 0.5 % cream KLVEER EXT AA BID  0     [DISCONTINUED] gabapentin (NEURONTIN) 100 MG  "capsule Take 1 capsule (100 mg) by mouth 3 times daily 90 capsule 1     [DISCONTINUED] gabapentin (NEURONTIN) 100 MG capsule Take 100 mg by mouth 3 times daily for nerve pain (sciatica in leg)       BP Readings from Last 3 Encounters:   07/26/18 107/70   12/11/17 98/69   11/27/17 106/69    Wt Readings from Last 3 Encounters:   07/26/18 139 lb 9.6 oz (63.3 kg)   12/11/17 147 lb 9.6 oz (67 kg)   11/27/17 141 lb (64 kg)                    Reviewed and updated as needed this visit by clinical staff  Tobacco  Allergies  Meds  Med Hx  Surg Hx  Fam Hx  Soc Hx      Reviewed and updated as needed this visit by Provider         ROS:  Constitutional, HEENT, cardiovascular, pulmonary, gi and gu systems are negative, except as otherwise noted.    OBJECTIVE:     /70 (BP Location: Left arm, Patient Position: Chair, Cuff Size: Adult Regular)  Pulse 90  Temp 96  F (35.6  C) (Tympanic)  Ht 5' 3\" (1.6 m)  Wt 139 lb 9.6 oz (63.3 kg)  LMP 07/16/2018  SpO2 100%  BMI 24.73 kg/m2  Body mass index is 24.73 kg/(m^2).  GENERAL: healthy, alert and no distress  EYES: Eyes grossly normal to inspection, PERRL and conjunctivae and sclerae normal  HENT: ear canals and TM's normal, nose and mouth without ulcers or lesions  NECK: no adenopathy, no asymmetry, masses, or scars and thyroid normal to palpation  RESP: lungs clear to auscultation - no rales, rhonchi or wheezes  CV: regular rate and rhythm, normal S1 S2, no S3 or S4, no murmur, click or rub, no peripheral edema and peripheral pulses strong  ABDOMEN: soft, nontender, no hepatosplenomegaly, no masses and bowel sounds normal  MS: no gross musculoskeletal defects noted, no edema  SKIN: erythematous, maculopapular lesions on right flank/abdomen extending to  Right groin and also on upper chest extending to right axilla, some lesions are coalescing, no herald patch, mildly pruritic, otherwise, no suspicious lesions or rashes  NEURO: Normal strength and tone, mentation intact " "and speech normal  PSYCH: mentation appears normal, affect normal/bright  LYMPH: normal ant/post cervical, supraclavicular nodes    Diagnostic Test Results:  Results for orders placed or performed in visit on 07/26/18 (from the past 24 hour(s))   KOH prep (skin, hair or nails only)   Result Value Ref Range    Specimen Description Chest     KOH Skin Hair Nails Test No fungal elements seen        ASSESSMENT/PLAN:           BMI:   Estimated body mass index is 24.73 kg/(m^2) as calculated from the following:    Height as of this encounter: 5' 3\" (1.6 m).    Weight as of this encounter: 139 lb 9.6 oz (63.3 kg).         1. Rash and nonspecific skin eruption  KOH is negative. Will treat with Prednisone burst and she can take Benadryl prn for itching, return to clinic if not improved, new, or worsening symptoms.   It is curious that she only has lesions on her right side,DD includes scabies but no linear arrangement, viral exanthem, fungal, bacterial.    - KOH prep (skin, hair or nails only)  - predniSONE (DELTASONE) 20 MG tablet; Take 2 tablets (40 mg) by mouth daily for 5 days  Dispense: 10 tablet; Refill: 0    See Patient Instructions    LIYAH Banerjee [facility]  "

## 2018-07-26 NOTE — MR AVS SNAPSHOT
After Visit Summary   7/26/2018    Jorge Mahajan    MRN: [id number]           Patient Information     Date Of Birth          1980        Visit Information        Provider Department      7/26/2018 8:20 AM Zora Silva APRN CNP [facility]        Today's Diagnoses     Rash and nonspecific skin eruption    -  1      Care Instructions    At [facility], we strive to deliver an exceptional experience to you, every time we see you.  If you receive a survey in the mail, please send us back your thoughts. We really do value your feedback.    Based on your medical history, these are the current health maintenance/preventive care services that you are due for (some may have been done at this visit.)  Health Maintenance Due   Topic Date Due     PHQ-2 Q1 YR  01/20/1993     TETANUS IMMUNIZATION (SYSTEM ASSIGNED)  01/20/1999     HIV SCREEN (SYSTEM ASSIGNED)  01/20/1999       Suggested websites for health information:  Www.Frequent Browser : Up to date and easily searchable information on multiple topics.  Www.medlineplus.gov : medication info, interactive tutorials, watch real surgeries online  Www.familydoctor.org : good info from the Academy of Family Physicians  Www.cdc.gov : public health info, travel advisories, epidemics (H1N1)  Www.aap.org : children's health info, normal development, vaccinations  Www.health.state[place]us : [place] dept of health, public health issues in [place], N1N1    Your care team:                            Family Medicine Internal Medicine   MD Darell Cooper MD Shantel Branch-Fleming, MD Katya Georgiev PA-C Megan Hill, APRN CNP Nam Ho, MD Pediatrics   YVONNE Henry CNP Paula Brito, MD Amelia Massimini APRSHIVAM CNP   MD Marya Maxwell MD Deborah Mielke, MD Kim Thein, APRN CNP      Clinic hours: Monday - Thursday 7 am-7 pm; Fridays 7 am-5 pm.   Urgent care: Monday - Friday 11 am-9 pm;  "Saturday and  9 am-5 pm.  Pharmacy : Monday -Thursday 8 am-8 pm; Friday 8 am-6 pm; Saturday and  9 am-5 pm.     Clinic: [phone]   Pharmacy: [phone]    Please call or return to the clinic for new or worsening symptoms.              Follow-ups after your visit        Who to contact     If you have questions or need follow up information about today's clinic visit or your schedule please contact [facility] directly at [phone].  Normal or non-critical lab and imaging results will be communicated to you by MyChart, letter or phone within 4 business days after the clinic has received the results. If you do not hear from us within 7 days, please contact the clinic through [employer] or phone. If you have a critical or abnormal lab result, we will notify you by phone as soon as possible.  Submit refill requests through [employer] or call your pharmacy and they will forward the refill request to us. Please allow 3 business days for your refill to be completed.          Additional Information About Your Visit        MyChart Information     [employer] lets you send messages to your doctor, view your test results, renew your prescriptions, schedule appointments and more. To sign up, go to [email or web address] . Click on \"Log in\" on the left side of the screen, which will take you to the Welcome page. Then click on \"Sign up Now\" on the right side of the page.     You will be asked to enter the access code listed below, as well as some personal information. Please follow the directions to create your username and password.     Your access code is: [email or web address]  Expires: 10/24/2018  9:17 AM     Your access code will  in 90 days. If you need help or a new code, please call your [facility] or [phone].        Care EveryWhere ID     This is your Care EveryWhere ID. This could be used by other organizations to access your [facility] medical records  [id number]        Your " "Vitals Were     Pulse Temperature Height Last Period Pulse Oximetry BMI (Body Mass Index)    90 96  F (35.6  C) (Tympanic) 5' 3\" (1.6 m) 07/16/2018 100% 24.73 kg/m2       Blood Pressure from Last 3 Encounters:   07/26/18 107/70   12/11/17 98/69   11/27/17 106/69    Weight from Last 3 Encounters:   07/26/18 139 lb 9.6 oz (63.3 kg)   12/11/17 147 lb 9.6 oz (67 kg)   11/27/17 141 lb (64 kg)              We Performed the Following     KOH prep (skin, hair or nails only)          Today's Medication Changes          These changes are accurate as of 7/26/18  9:17 AM.  If you have any questions, ask your nurse or doctor.               Start taking these medicines.        Dose/Directions    predniSONE 20 MG tablet   Commonly known as:  DELTASONE   Used for:  Rash and nonspecific skin eruption   Started by:  Zora Silva APRN CNP        Dose:  40 mg   Take 2 tablets (40 mg) by mouth daily for 5 days   Quantity:  10 tablet   Refills:  0         These medicines have changed or have updated prescriptions.        Dose/Directions    gabapentin 300 MG capsule   Commonly known as:  NEURONTIN   This may have changed:  Another medication with the same name was removed. Continue taking this medication, and follow the directions you see here.   Used for:  Lumbar radiculopathy   Changed by:  Zora Silva APRN CNP        Dose:  300 mg   Take 1 capsule (300 mg) by mouth 3 times daily   Quantity:  90 capsule   Refills:  1            Where to get your medicines      [facility] [address] [address] [place] [address]  [address] [facility] [id number]    Hours:  24-hours Phone:  [phone]     predniSONE 20 MG tablet                Primary Care Provider Fax #    Physician No Ref-Primary [phone]       No address on file        Equal Access to Services     ANDREA BARAHONA AH: Frida Logan, madina chávez, carlos pearson " anali dorantesronan la'aan ah. So [facility] [phone].    ATENCIÓN: Si habla sudhakar, tiene a lincoln disposición servicios gratuitos de asistencia lingüística. Boy al [phone].    We comply with applicable federal civil rights laws and [place] laws. We do not discriminate on the basis of race, color, national origin, age, disability, sex, sexual orientation, or gender identity.            Thank you!     Thank you for choosing [facility]  for your care. Our goal is always to provide you with excellent care. Hearing back from our patients is one way we can continue to improve our services. Please take a few minutes to complete the written survey that you may receive in the mail after your visit with us. Thank you!             Your Updated Medication List - Protect others around you: Learn how to safely use, store and throw away your medicines at www.disposemymeds.org.          This list is accurate as of 7/26/18  9:17 AM.  Always use your most recent med list.                   Brand Name Dispense Instructions for use Diagnosis    gabapentin 300 MG capsule    NEURONTIN    90 capsule    Take 1 capsule (300 mg) by mouth 3 times daily    Lumbar radiculopathy       predniSONE 20 MG tablet    DELTASONE    10 tablet    Take 2 tablets (40 mg) by mouth daily for 5 days    Rash and nonspecific skin eruption       triamcinolone 0.5 % cream    KENALOG     KLEVER EXT AA BID

## 2018-08-14 DIAGNOSIS — M54.16 LUMBAR RADICULOPATHY: ICD-10-CM

## 2018-08-14 RX ORDER — GABAPENTIN 300 MG/1
300 CAPSULE ORAL 3 TIMES DAILY
Qty: 90 CAPSULE | Refills: 1 | Status: SHIPPED | OUTPATIENT
Start: 2018-08-14 | End: 2018-11-07

## 2018-08-14 NOTE — TELEPHONE ENCOUNTER
gabapentin (NEURONTIN) 300 MG capsule 90 capsule 1 5/22/2018       Last Written Prescription Date:  05/22/2018  Last Fill Quantity: 90,   # refills: 1  Last Office Visit: 12/11/17  Future Office visit:

## 2018-11-07 DIAGNOSIS — M54.16 LUMBAR RADICULOPATHY: ICD-10-CM

## 2018-11-07 RX ORDER — GABAPENTIN 300 MG/1
300 CAPSULE ORAL 3 TIMES DAILY
Qty: 90 CAPSULE | Refills: 1 | Status: SHIPPED | OUTPATIENT
Start: 2018-11-07 | End: 2020-03-18

## 2018-11-07 NOTE — TELEPHONE ENCOUNTER
gabapentin (NEURONTIN) 300 MG capsule 90 capsule 1 8/14/2018       Last Written Prescription Date:  08/14/2018  Last Fill Quantity: 90,   # refills: 1  Last Office Visit: 12/11/2017  Future Office visit:

## 2019-02-16 ENCOUNTER — OFFICE VISIT (OUTPATIENT)
Dept: URGENT CARE | Facility: URGENT CARE | Age: 39
End: 2019-02-16
Payer: COMMERCIAL

## 2019-02-16 VITALS
OXYGEN SATURATION: 100 % | SYSTOLIC BLOOD PRESSURE: 121 MMHG | HEART RATE: 110 BPM | DIASTOLIC BLOOD PRESSURE: 77 MMHG | TEMPERATURE: 100.1 F | RESPIRATION RATE: 20 BRPM | WEIGHT: 139 LBS | BODY MASS INDEX: 24.62 KG/M2

## 2019-02-16 DIAGNOSIS — J02.0 STREPTOCOCCAL PHARYNGITIS: Primary | ICD-10-CM

## 2019-02-16 DIAGNOSIS — J02.9 SORE THROAT: ICD-10-CM

## 2019-02-16 LAB
DEPRECATED S PYO AG THROAT QL EIA: ABNORMAL
FLUAV+FLUBV AG SPEC QL: NEGATIVE
FLUAV+FLUBV AG SPEC QL: NEGATIVE
SPECIMEN SOURCE: ABNORMAL
SPECIMEN SOURCE: NORMAL

## 2019-02-16 PROCEDURE — 99213 OFFICE O/P EST LOW 20 MIN: CPT | Performed by: NURSE PRACTITIONER

## 2019-02-16 PROCEDURE — 87880 STREP A ASSAY W/OPTIC: CPT | Performed by: NURSE PRACTITIONER

## 2019-02-16 PROCEDURE — 87804 INFLUENZA ASSAY W/OPTIC: CPT | Performed by: NURSE PRACTITIONER

## 2019-02-16 RX ORDER — AMOXICILLIN 500 MG/1
500 CAPSULE ORAL 2 TIMES DAILY
Qty: 20 CAPSULE | Refills: 0 | Status: SHIPPED | OUTPATIENT
Start: 2019-02-16

## 2019-02-16 RX ORDER — PROCHLORPERAZINE MALEATE 10 MG
10 TABLET ORAL
COMMUNITY
Start: 2010-09-10

## 2019-02-16 ASSESSMENT — ENCOUNTER SYMPTOMS
SORE THROAT: 1
DYSURIA: 0
FEVER: 1
SHORTNESS OF BREATH: 0
APPETITE CHANGE: 1
NAUSEA: 1
FATIGUE: 1
RHINORRHEA: 1
COUGH: 1
DIARRHEA: 0
VOMITING: 0
MYALGIAS: 1
CHILLS: 1
ACTIVITY CHANGE: 1
WHEEZING: 0
HEADACHES: 0

## 2019-02-16 NOTE — PROGRESS NOTES
SUBJECTIVE:   Jorge Mahajan is a 39 year old female presenting with a chief complaint of   Chief Complaint   Patient presents with     URI     fever, cough, runny/stuff nose, chest congestion, sore throat for 3 days       She is an established patient of [facility].    URI Adult    Onset of symptoms was 3 days ago.  Course of illness is worsening.    Severity 8/10 for sore throat & ear pain bilaterally   Current and Associated symptoms: fever, chills, runny nose, stuffy nose, cough, sore throat, body aches, fatigue, nausea and chest congestion.  Treatment measures tried include Tylenol OTC with no relief  Predisposing factors include ill contact: Work exposure   Reports increased fatigue, decreased appetite, and disruption in sleep due to fever/sore throat; however no changes in bowel or bladder habits.   LMP: 02/13/2019    Review of Systems   Constitutional: Positive for activity change, appetite change, chills, fatigue and fever.   HENT: Positive for congestion, ear pain, rhinorrhea and sore throat.    Respiratory: Positive for cough. Negative for shortness of breath and wheezing.    Gastrointestinal: Positive for nausea. Negative for diarrhea and vomiting.   Genitourinary: Negative for dysuria.   Musculoskeletal: Positive for myalgias.   Skin: Negative.    Neurological: Negative for headaches.   All other systems reviewed and are negative.      Past Medical History:   Diagnosis Date     General counseling for prescription of oral contraceptives 2/15/2010         Headaches      Family History   Problem Relation Age of Onset     Diabetes Father         TYPE II     Family History Negative Other      Current Outpatient Medications   Medication Sig Dispense Refill     amoxicillin (AMOXIL) 500 MG capsule Take 1 capsule (500 mg) by mouth 2 times daily 20 capsule 0     gabapentin (NEURONTIN) 300 MG capsule Take 1 capsule (300 mg) by mouth 3 times daily 90 capsule 1     prochlorperazine (COMPAZINE) 10 MG tablet Take  10 mg by mouth       triamcinolone (KENALOG) 0.5 % cream KLEVER EXT AA BID  0     Social History     Tobacco Use     Smoking status: Never Smoker     Smokeless tobacco: Never Used   Substance Use Topics     Alcohol use: No       OBJECTIVE  /77 (BP Location: Left arm, Patient Position: Chair, Cuff Size: Adult Regular)   Pulse 110   Temp 100.1  F (37.8  C) (Oral)   Resp 20   Wt 63 kg (139 lb)   SpO2 100%   Breastfeeding? No   BMI 24.62 kg/m      Physical Exam   Constitutional: She is oriented to person, place, and time. No distress.   HENT:   Right Ear: External ear normal.   Left Ear: External ear normal.   Mouth/Throat: Oropharyngeal exudate present.   Nasal: bilateral congestion  Oropharynx: significant hypertrophy with exudate, airway patent   Neck: Neck supple.   Cardiovascular: Regular rhythm, normal heart sounds and intact distal pulses.   No murmur heard.  Tachycardic in setting of fever   Pulmonary/Chest: Effort normal and breath sounds normal. No respiratory distress. She has no wheezes.   Lymphadenopathy:     She has cervical adenopathy.   Neurological: She is alert and oriented to person, place, and time.   Skin: Skin is warm. Capillary refill takes less than 2 seconds. No rash noted.   Psychiatric: She has a normal mood and affect.       Labs:  Results for orders placed or performed in visit on 02/16/19 (from the past 24 hour(s))   Strep, Rapid Screen   Result Value Ref Range    Specimen Description Throat     Rapid Strep A Screen (A)      POSITIVE: Group A Streptococcal antigen detected by immunoassay.   Influenza A/B antigen   Result Value Ref Range    Influenza A/B Agn Specimen Nasopharyngeal     Influenza A Negative NEG^Negative    Influenza B Negative NEG^Negative         ASSESSMENT:    ICD-10-CM    1. Streptococcal pharyngitis J02.0 amoxicillin (AMOXIL) 500 MG capsule   2. Sore throat J02.9 Strep, Rapid Screen   3. Fever R50.9 Influenza A/B antigen        Medical Decision  Making:    Differential Diagnosis:  URI Adult/Peds:  Influenza, Strep pharyngitis, Viral pharyngitis, Viral syndrome and Viral upper respiratory illness    Serious Comorbid Conditions:  Adult:  None    PLAN: Discussed with parent and patient causes, treatment options for throat infections. Discussed the importance of antibiotic course completion. Education provided regarding strep infection and advised to change toothbrush in 3 days to avoid reinfection. Letter for work provided. Follow up with PCP in 3 days or sooner if not improved or symptoms worsen. Patient agreed to the plan of care.      Don Bush APRN, CNP      Patient Instructions     Patient Education     Pharyngitis: Strep (Confirmed)    You have had a positive test for strep throat. Strep throat is a contagious illness. It is spread by coughing, kissing or by touching others after touching your mouth or nose. Symptoms include throat pain that is worse with swallowing, aching all over, headache, and fever. It is treated with antibiotic medicine. This should help you start to feel better in 1 to 2 days.  Home care    Rest at home. Drink plenty of fluids to you won't get dehydrated.    No work or school for the first 2 days of taking the antibiotics. After this time, you will not be contagious. You can then return to school or work if you are feeling better.     Take antibiotic medicine for the full 10 days, even if you feel better. This is very important to ensure the infection is treated. It is also important to prevent medicine-resistant germs from developing. If you were given an antibiotic shot, you don't need any more antibiotics.    You may use acetaminophen or ibuprofen to control pain or fever, unless another medicine was prescribed for this. Talk with your healthcare provider before taking these medicines if you have chronic liver or kidney disease. Also talk with your healthcare provider if you have had a stomach ulcer or GI  bleeding.    Throat lozenges or sprays help reduce pain. Gargling with warm saltwater will also reduce throat pain. Dissolve 1/2 teaspoon of salt in 1 glass of warm water. This may be useful just before meals.     Soft foods are OK. Don't eat salty or spicy foods.  Follow-up care  Follow up with your healthcare provider or our staff if you don't get better over the next week.  When to seek medical advice  Call your healthcare provider right away if any of these occur:    Fever of 100.4 F (38 C) or higher, or as directed by your healthcare provider    New or worsening ear pain, sinus pain, or headache    Painful lumps in the back of neck    Stiff neck    Lymph nodes getting larger or becoming soft in the middle    You can't swallow liquids or you can't open your mouth wide because of throat pain    Signs of dehydration. These include very dark urine or no urine, sunken eyes, and dizziness.    Trouble breathing or noisy breathing    Muffled voice    Rash  Prevention  Here are steps you can take to help prevent an infection:    Keep good hand washing habits.    Don t have close contact with people who have sore throats, colds, or other upper respiratory infections.    Don t smoke, and stay away from secondhand smoke.  Date Last Reviewed: 11/1/2017 2000-2018 [employer]. [address] [facility], [place] [address]. All rights reserved. This information is not intended as a substitute for professional medical care. Always follow your healthcare professional's instructions.

## 2019-02-16 NOTE — PATIENT INSTRUCTIONS
Patient Education     Pharyngitis: Strep (Confirmed)    You have had a positive test for strep throat. Strep throat is a contagious illness. It is spread by coughing, kissing or by touching others after touching your mouth or nose. Symptoms include throat pain that is worse with swallowing, aching all over, headache, and fever. It is treated with antibiotic medicine. This should help you start to feel better in 1 to 2 days.  Home care    Rest at home. Drink plenty of fluids to you won't get dehydrated.    No work or school for the first 2 days of taking the antibiotics. After this time, you will not be contagious. You can then return to school or work if you are feeling better.     Take antibiotic medicine for the full 10 days, even if you feel better. This is very important to ensure the infection is treated. It is also important to prevent medicine-resistant germs from developing. If you were given an antibiotic shot, you don't need any more antibiotics.    You may use acetaminophen or ibuprofen to control pain or fever, unless another medicine was prescribed for this. Talk with your healthcare provider before taking these medicines if you have chronic liver or kidney disease. Also talk with your healthcare provider if you have had a stomach ulcer or GI bleeding.    Throat lozenges or sprays help reduce pain. Gargling with warm saltwater will also reduce throat pain. Dissolve 1/2 teaspoon of salt in 1 glass of warm water. This may be useful just before meals.     Soft foods are OK. Don't eat salty or spicy foods.  Follow-up care  Follow up with your healthcare provider or our staff if you don't get better over the next week.  When to seek medical advice  Call your healthcare provider right away if any of these occur:    Fever of 100.4 F (38 C) or higher, or as directed by your healthcare provider    New or worsening ear pain, sinus pain, or headache    Painful lumps in the back of neck    Stiff neck    Lymph  nodes getting larger or becoming soft in the middle    You can't swallow liquids or you can't open your mouth wide because of throat pain    Signs of dehydration. These include very dark urine or no urine, sunken eyes, and dizziness.    Trouble breathing or noisy breathing    Muffled voice    Rash  Prevention  Here are steps you can take to help prevent an infection:    Keep good hand washing habits.    Don t have close contact with people who have sore throats, colds, or other upper respiratory infections.    Don t smoke, and stay away from secondhand smoke.  Date Last Reviewed: 11/1/2017 2000-2018 [employer]. [address] [place] [address], [place] [address]. All rights reserved. This information is not intended as a substitute for professional medical care. Always follow your healthcare professional's instructions.

## 2019-02-16 NOTE — LETTER
Return to work release    Date: 2/16/2019      Name: Jorge Mahajan                       YOB: 1980    The patient was seen at: [facility], may return to work on 02/18/2019.             _________________________  LIYAH Zamora CNP

## 2019-02-19 ENCOUNTER — TELEPHONE (OUTPATIENT)
Dept: ORTHOPEDICS | Facility: CLINIC | Age: 39
End: 2019-02-19

## 2019-02-19 NOTE — TELEPHONE ENCOUNTER
The patient was contacted today and a voicemail was unable to be left.     Dr. Farrar has denied the patient gabapentin refill request. The patient needs to follow up in clinic prior to medication refills.     [facility] in [place] was faxed with a prescription denial.

## 2019-03-06 ENCOUNTER — DOCUMENTATION ONLY (OUTPATIENT)
Dept: ORTHOPEDICS | Facility: CLINIC | Age: 39
End: 2019-03-06

## 2019-03-06 NOTE — PROGRESS NOTES
Patients refill request for gabapentin has been denied until the patient is seen in clinic. A denial was faxed to [facility].

## 2019-03-13 ENCOUNTER — OFFICE VISIT (OUTPATIENT)
Dept: ORTHOPEDICS | Facility: CLINIC | Age: 39
End: 2019-03-13
Payer: COMMERCIAL

## 2019-03-13 VITALS
WEIGHT: 139 LBS | HEART RATE: 83 BPM | DIASTOLIC BLOOD PRESSURE: 70 MMHG | SYSTOLIC BLOOD PRESSURE: 103 MMHG | HEIGHT: 63 IN | BODY MASS INDEX: 24.63 KG/M2

## 2019-03-13 DIAGNOSIS — M54.16 LUMBAR RADICULOPATHY: Primary | ICD-10-CM

## 2019-03-13 PROCEDURE — 99213 OFFICE O/P EST LOW 20 MIN: CPT | Performed by: FAMILY MEDICINE

## 2019-03-13 RX ORDER — GABAPENTIN 300 MG/1
300 CAPSULE ORAL 3 TIMES DAILY
Qty: 270 CAPSULE | Refills: 3 | Status: SHIPPED | OUTPATIENT
Start: 2019-03-13 | End: 2021-10-07

## 2019-03-13 ASSESSMENT — PAIN SCALES - GENERAL: PAINLEVEL: NO PAIN (0)

## 2019-03-13 ASSESSMENT — MIFFLIN-ST. JEOR: SCORE: 1279.63

## 2019-03-13 NOTE — PROGRESS NOTES
"      [facility] FOLLOW-UP VISIT 3/13/2019    Jorge Mahajan's chief complaint for this visit includes:  Chief Complaint   Patient presents with     RECHECK     f/u back pain and she would like to discuss refilling her gabapentin prescriptions.      PCP: No Ref-Primary, Physician    Referring Provider:  Referred Self, MD  No address on file    Ht 1.6 m (5' 3\")   Wt 63 kg (139 lb)   BMI 24.62 kg/m    No Pain (0)       Interval History:     Follow up reason: back pain and prescription refill     Date last seen: 12/11/17    Following Therapeutic Plan: Yes     Pain: Improving    Function: Improving    Medical History:    Any recent changes to your medical history? No    Any new medication prescribed since last visit? No    Review of Systems:    Do you have fever, chills, weight loss? No    Do you have any vision problems? No    Do you have any chest pain or edema? No    Do you have any shortness of breath or wheezing?  No    Do you have stomach problems? No    Do you have any numbness or focal weakness? No    Do you have diabetes? No    Do you have problems with bleeding or clotting? No    Do you have an rashes or other skin lesions? No    "

## 2019-03-13 NOTE — LETTER
"    3/13/2019         RE: Jorge Mahajan  [address] [place] [address]        Dear Colleague,    Thank you for referring your patient, Jorge Mahajan, to the [facility]. Please see a copy of my visit note below.          [facility] FOLLOW-UP VISIT 3/13/2019    Jorge Mahajan's chief complaint for this visit includes:  Chief Complaint   Patient presents with     RECHECK     f/u back pain and she would like to discuss refilling her gabapentin prescriptions.      PCP: No Ref-Primary, Physician    Referring Provider:  Referred Self, MD  No address on file    Ht 1.6 m (5' 3\")   Wt 63 kg (139 lb)   BMI 24.62 kg/m     No Pain (0)       Interval History:     Follow up reason: back pain and prescription refill     Date last seen: 12/11/17    Following Therapeutic Plan: Yes     Pain: Improving    Function: Improving    Medical History:    Any recent changes to your medical history? No    Any new medication prescribed since last visit? No    Review of Systems:    Do you have fever, chills, weight loss? No    Do you have any vision problems? No    Do you have any chest pain or edema? No    Do you have any shortness of breath or wheezing?  No    Do you have stomach problems? No    Do you have any numbness or focal weakness? No    Do you have diabetes? No    Do you have problems with bleeding or clotting? No    Do you have an rashes or other skin lesions? No      HISTORY OF PRESENT ILLNESS  Ms. Mahajan is a pleasant 39 year old year old female following up with lumbar radiculopathy.  Jorge has been doing great over the past year.  She has pain on occasion but overall feels about 95% improved.  When she does have pain it is mostly in her left leg, dull, burning pain that radiates down.  She denies any weakness, no numbness or tingling.  Overall she is doing well, she would like a refill on her medications.  She is traveling to [facility] for the months of June and July.  Additional " "history: as documented      REVIEW OF SYSTEMS (3/13/2019)  10 point ROS of systems including Constitutional, Eyes, Respiratory, Cardiovascular, Gastroenterology, Genitourinary, Integumentary, Musculoskeletal, Psychiatric were all negative except for pertinent positives noted in my HPI.     PHYSICAL EXAM  Vitals:    03/13/19 0902   BP: 103/70   BP Location: Right arm   Patient Position: Sitting   Pulse: 83   Weight: 63 kg (139 lb)   Height: 1.6 m (5' 3\")     General  - normal appearance, in no obvious distress  CV  - normal peripheral perfusion  Pulm  - normal respiratory pattern, non-labored  Musculoskeletal - lumbar spine  - stance: normal gait without limp  - inspection: normal bone and joint alignment, no obvious scoliosis  - palpation: no paravertebral or bony tenderness  - ROM: normal and painless flexion, extension, left and right sidebending and rotation  - strength: lower extremities 5/5 in all planes  - special tests:  (-) slump test  Neuro  - patellar and Achilles DTRs 2+ bilaterally, no sensory or motor deficit, grossly normal coordination, normal muscle tone  Skin  - no ecchymosis, erythema, warmth, or induration, no obvious rash  Psych  - interactive, appropriate, normal mood and affect        ASSESSMENT & PLAN  Ms. Mahajan is a 39 year old year old female following up with lumbar radiculopathy.    I am happy that Jorge is doing well.  I did refill her gabapentin, she is stable and satisfied on 300 mg 3 times daily.  I refilled this with a 90-day supply for the next year.    We can follow-up as needed for this and other issues in the future if she continues to do well.    It was a pleasure seeing Jorge.        Jaren Farrar DO, CAQSM          Again, thank you for allowing me to participate in the care of your patient.        Sincerely,        Jaren Farrar DO    "

## 2019-03-13 NOTE — PROGRESS NOTES
"HISTORY OF PRESENT ILLNESS  Ms. Mahajan is a pleasant 39 year old year old female following up with lumbar radiculopathy.  Jorge has been doing great over the past year.  She has pain on occasion but overall feels about 95% improved.  When she does have pain it is mostly in her left leg, dull, burning pain that radiates down.  She denies any weakness, no numbness or tingling.  Overall she is doing well, she would like a refill on her medications.  She is traveling to [facility] for the months of June and July.  Additional history: as documented      REVIEW OF SYSTEMS (3/13/2019)  10 point ROS of systems including Constitutional, Eyes, Respiratory, Cardiovascular, Gastroenterology, Genitourinary, Integumentary, Musculoskeletal, Psychiatric were all negative except for pertinent positives noted in my HPI.     PHYSICAL EXAM  Vitals:    03/13/19 0902   BP: 103/70   BP Location: Right arm   Patient Position: Sitting   Pulse: 83   Weight: 63 kg (139 lb)   Height: 1.6 m (5' 3\")     General  - normal appearance, in no obvious distress  CV  - normal peripheral perfusion  Pulm  - normal respiratory pattern, non-labored  Musculoskeletal - lumbar spine  - stance: normal gait without limp  - inspection: normal bone and joint alignment, no obvious scoliosis  - palpation: no paravertebral or bony tenderness  - ROM: normal and painless flexion, extension, left and right sidebending and rotation  - strength: lower extremities 5/5 in all planes  - special tests:  (-) slump test  Neuro  - patellar and Achilles DTRs 2+ bilaterally, no sensory or motor deficit, grossly normal coordination, normal muscle tone  Skin  - no ecchymosis, erythema, warmth, or induration, no obvious rash  Psych  - interactive, appropriate, normal mood and affect        ASSESSMENT & PLAN  Ms. Mahajan is a 39 year old year old female following up with lumbar radiculopathy.    I am happy that Jorge is doing well.  I did refill her gabapentin, she is stable and " satisfied on 300 mg 3 times daily.  I refilled this with a 90-day supply for the next year.    We can follow-up as needed for this and other issues in the future if she continues to do well.    It was a pleasure seeing Geremit.        Jaren Farrar DO, CAQSM

## 2019-08-23 ENCOUNTER — ANCILLARY PROCEDURE (OUTPATIENT)
Dept: GENERAL RADIOLOGY | Facility: CLINIC | Age: 39
End: 2019-08-23
Attending: FAMILY MEDICINE
Payer: COMMERCIAL

## 2019-08-23 ENCOUNTER — OFFICE VISIT (OUTPATIENT)
Dept: ORTHOPEDICS | Facility: CLINIC | Age: 39
End: 2019-08-23
Payer: COMMERCIAL

## 2019-08-23 VITALS — HEIGHT: 63 IN | BODY MASS INDEX: 24.63 KG/M2 | WEIGHT: 139 LBS

## 2019-08-23 DIAGNOSIS — M54.12 CERVICAL RADICULOPATHY: ICD-10-CM

## 2019-08-23 DIAGNOSIS — M54.12 CERVICAL RADICULOPATHY: Primary | ICD-10-CM

## 2019-08-23 PROCEDURE — 99214 OFFICE O/P EST MOD 30 MIN: CPT | Performed by: FAMILY MEDICINE

## 2019-08-23 PROCEDURE — 72040 X-RAY EXAM NECK SPINE 2-3 VW: CPT | Performed by: RADIOLOGY

## 2019-08-23 RX ORDER — PREDNISONE 20 MG/1
40 TABLET ORAL DAILY
Qty: 10 TABLET | Refills: 0 | Status: SHIPPED | OUTPATIENT
Start: 2019-08-23 | End: 2019-08-28

## 2019-08-23 ASSESSMENT — MIFFLIN-ST. JEOR: SCORE: 1279.63

## 2019-08-23 ASSESSMENT — PAIN SCALES - GENERAL: PAINLEVEL: NO PAIN (0)

## 2019-08-23 NOTE — PROGRESS NOTES
"CHIEF COMPLAINT:  Consult (Right arm pain and a crawling feeling, no known injury, started about 2 months ago )       HISTORY OF PRESENT ILLNESS  Ms. Mahajan is a pleasant 39 year old year old female who presents to clinic today with right arm issues.  Jorge has had pain and a crawling sensation in her right arm for the past couple of months.  This starts in her shoulder region and travels down the palmar aspect of her upper arm down to her hand.  More painful but there is components of numbness and tingling.  She feels as if the symptoms are throughout her entire arm.  This is her dominant arm.  She denies neck pain.        Additional history: as documented    MEDICAL HISTORY  Patient Active Problem List   Diagnosis     Cervical cancer screening     Acute low back pain with right-sided sciatica     Degeneration of lumbar or lumbosacral intervertebral disc       Current Outpatient Medications   Medication Sig Dispense Refill     amoxicillin (AMOXIL) 500 MG capsule Take 1 capsule (500 mg) by mouth 2 times daily 20 capsule 0     gabapentin (NEURONTIN) 300 MG capsule Take 1 capsule (300 mg) by mouth 3 times daily 270 capsule 3     gabapentin (NEURONTIN) 300 MG capsule Take 1 capsule (300 mg) by mouth 3 times daily 90 capsule 1     prochlorperazine (COMPAZINE) 10 MG tablet Take 10 mg by mouth       triamcinolone (KENALOG) 0.5 % cream KLEVER EXT AA BID  0       No Known Allergies    Family History   Problem Relation Age of Onset     Diabetes Father         TYPE II     Family History Negative Other        Additional medical/Social/Surgical histories reviewed in [facility] and updated as appropriate.        PHYSICAL EXAM    Vitals:    08/23/19 0851   Weight: 63 kg (139 lb)   Height: 1.6 m (5' 3\")     General  - normal appearance, in no obvious distress  CV  - normal peripheral perfusion  Pulm  - normal respiratory pattern, non-labored  Musculoskeletal - cervical spine  - inspection: normal bone and joint alignment, no obvious " "kyphosis  - palpation: no paravertebral or bony tenderness  - ROM: pain with bilateral rotation and sidebending  - strength: upper extremities 5/5 in all planes  Neuro  - C5-7 DTRs 2+ bilaterally, no sensory or motor deficit, grossly normal coordination, normal muscle tone  Skin  - no ecchymosis, erythema, warmth, or induration, no obvious rash  Psych  - interactive, appropriate, normal mood and affect               ASSESSMENT & PLAN  Ms. Mahajan is a 39 year old year old female who presents to clinic today with right arm pain.    I ordered and reviewed an x-ray of her cervical spine which does show disc space narrowing at C5-6.  This fits well with her symptom pattern.    I did prescribe her some prednisone for her symptoms.  I am also referring her to physical therapy.    If her symptoms do not improve over the next few weeks to a month I would likely order an MRI.    It was a pleasure seeing Jorge today.    Jaren Farrar DO, [facility]  [facility]      This note was constructed using Dragon dictation software, please excuse any minor errors in spelling, grammar, or syntax.        [facility] FOLLOW-UP VISIT 8/23/2019    Jorge Mahajan's chief complaint for this visit includes:  Chief Complaint   Patient presents with     Consult     Right arm pain and a crawling feeling, no known injury, started about 2 months ago      PCP: No Ref-Primary, Physician    Referring Provider:  No referring provider defined for this encounter.    Ht 1.6 m (5' 3\")   Wt 63 kg (139 lb)   BMI 24.62 kg/m    No Pain (0)       Interval History:     Follow up reason: right arm pain and crawling feeling     Medical History:    Any recent changes to your medical history? No     Any new medication prescribed since last visit? No    Review of Systems:    Do you have fever, chills, weight loss? No    Do you have any vision problems? No    Do you have any chest pain or edema? No    Do you have any shortness of breath or " wheezing?  No    Do you have stomach problems? No    Do you have any numbness or focal weakness? No    Do you have diabetes? No    Do you have problems with bleeding or clotting? No    Do you have an rashes or other skin lesions? No

## 2019-08-23 NOTE — LETTER
8/23/2019         RE: Jorge Mahajan  [address]  [place] [address]        Dear Colleague,    Thank you for referring your patient, Jorge Mahajan, to the [facility]. Please see a copy of my visit note below.    CHIEF COMPLAINT:  Consult (Right arm pain and a crawling feeling, no known injury, started about 2 months ago )       HISTORY OF PRESENT ILLNESS  Ms. Mahajan is a pleasant 39 year old year old female who presents to clinic today with right arm issues.  Jorge has had pain and a crawling sensation in her right arm for the past couple of months.  This starts in her shoulder region and travels down the palmar aspect of her upper arm down to her hand.  More painful but there is components of numbness and tingling.  She feels as if the symptoms are throughout her entire arm.  This is her dominant arm.  She denies neck pain.        Additional history: as documented    MEDICAL HISTORY  Patient Active Problem List   Diagnosis     Cervical cancer screening     Acute low back pain with right-sided sciatica     Degeneration of lumbar or lumbosacral intervertebral disc       Current Outpatient Medications   Medication Sig Dispense Refill     amoxicillin (AMOXIL) 500 MG capsule Take 1 capsule (500 mg) by mouth 2 times daily 20 capsule 0     gabapentin (NEURONTIN) 300 MG capsule Take 1 capsule (300 mg) by mouth 3 times daily 270 capsule 3     gabapentin (NEURONTIN) 300 MG capsule Take 1 capsule (300 mg) by mouth 3 times daily 90 capsule 1     prochlorperazine (COMPAZINE) 10 MG tablet Take 10 mg by mouth       triamcinolone (KENALOG) 0.5 % cream KLEVER EXT AA BID  0       No Known Allergies    Family History   Problem Relation Age of Onset     Diabetes Father         TYPE II     Family History Negative Other        Additional medical/Social/Surgical histories reviewed in [facility] and updated as appropriate.        PHYSICAL EXAM    Vitals:    08/23/19 0851   Weight: 63 kg (139 lb)   Height: 1.6 m (5'  "3\")     General  - normal appearance, in no obvious distress  CV  - normal peripheral perfusion  Pulm  - normal respiratory pattern, non-labored  Musculoskeletal - cervical spine  - inspection: normal bone and joint alignment, no obvious kyphosis  - palpation: no paravertebral or bony tenderness  - ROM: pain with bilateral rotation and sidebending  - strength: upper extremities 5/5 in all planes  Neuro  - C5-7 DTRs 2+ bilaterally, no sensory or motor deficit, grossly normal coordination, normal muscle tone  Skin  - no ecchymosis, erythema, warmth, or induration, no obvious rash  Psych  - interactive, appropriate, normal mood and affect               ASSESSMENT & PLAN  Ms. Mahajan is a 39 year old year old female who presents to clinic today with right arm pain.    I ordered and reviewed an x-ray of her cervical spine which does show disc space narrowing at C5-6.  This fits well with her symptom pattern.    I did prescribe her some prednisone for her symptoms.  I am also referring her to physical therapy.    If her symptoms do not improve over the next few weeks to a month I would likely order an MRI.    It was a pleasure seeing Jorge today.    Jaren Farrar DO, [facility]  Primary Care Sports Medicine      This note was constructed using Dragon dictation software, please excuse any minor errors in spelling, grammar, or syntax.        [facility] FOLLOW-UP VISIT 8/23/2019    Jorge Mahajan's chief complaint for this visit includes:  Chief Complaint   Patient presents with     Consult     Right arm pain and a crawling feeling, no known injury, started about 2 months ago      PCP: No Ref-Primary, Physician    Referring Provider:  No referring provider defined for this encounter.    Ht 1.6 m (5' 3\")   Wt 63 kg (139 lb)   BMI 24.62 kg/m     No Pain (0)       Interval History:     Follow up reason: right arm pain and crawling feeling     Medical History:    Any recent changes to your medical history? No     Any " new medication prescribed since last visit? No    Review of Systems:    Do you have fever, chills, weight loss? No    Do you have any vision problems? No    Do you have any chest pain or edema? No    Do you have any shortness of breath or wheezing?  No    Do you have stomach problems? No    Do you have any numbness or focal weakness? No    Do you have diabetes? No    Do you have problems with bleeding or clotting? No    Do you have an rashes or other skin lesions? No      Again, thank you for allowing me to participate in the care of your patient.        Sincerely,        Jaren Farrar, DO

## 2020-03-18 DIAGNOSIS — M54.16 LUMBAR RADICULOPATHY: ICD-10-CM

## 2020-03-18 RX ORDER — GABAPENTIN 300 MG/1
CAPSULE ORAL
Qty: 270 CAPSULE | Refills: 1 | Status: SHIPPED | OUTPATIENT
Start: 2020-03-18

## 2021-10-07 DIAGNOSIS — M54.16 LUMBAR RADICULOPATHY: ICD-10-CM

## 2021-10-07 RX ORDER — GABAPENTIN 300 MG/1
300 CAPSULE ORAL 3 TIMES DAILY
Qty: 270 CAPSULE | Refills: 3 | Status: SHIPPED | OUTPATIENT
Start: 2021-10-07 | End: 2022-10-24

## 2022-10-19 ENCOUNTER — TELEPHONE (OUTPATIENT)
Dept: ORTHOPEDICS | Facility: CLINIC | Age: 42
End: 2022-10-19

## 2022-10-19 DIAGNOSIS — M54.16 LUMBAR RADICULOPATHY: ICD-10-CM

## 2022-10-19 NOTE — TELEPHONE ENCOUNTER
[facility] Call Center    Phone Message    May a detailed message be left on voicemail: yes     Reason for Call Patient in Pain. Been waiting for Gabapentin 300mg at Pharmacy. Pharmacy say No One Responding to Them at Clinic. Please call Patient. Action Taken: Message routed to:  [facility] wolfgang    Travel Screening: Not Applicable

## 2022-10-21 ENCOUNTER — TELEPHONE (OUTPATIENT)
Dept: ORTHOPEDICS | Facility: CLINIC | Age: 42
End: 2022-10-21

## 2022-10-21 NOTE — TELEPHONE ENCOUNTER
[facility] Call Center    Phone Message    May a detailed message be left on voicemail: yes     Reason for Call: Other: Patient is calling still waiting for her prescription. Please call her.     Action Taken: Other: [facility]    Travel Screening: Not Applicable

## 2022-10-24 RX ORDER — GABAPENTIN 300 MG/1
300 CAPSULE ORAL 3 TIMES DAILY
Qty: 270 CAPSULE | Refills: 3 | Status: SHIPPED | OUTPATIENT
Start: 2022-10-24 | End: 2023-12-14

## 2022-10-24 NOTE — TELEPHONE ENCOUNTER
Dr. Farrar signed the patient medication today, it has been received by the pharmacy.  A voicemail was left.

## 2022-11-28 ENCOUNTER — OFFICE VISIT (OUTPATIENT)
Dept: ORTHOPEDICS | Facility: CLINIC | Age: 42
End: 2022-11-28
Payer: COMMERCIAL

## 2022-11-28 DIAGNOSIS — M54.16 LUMBAR RADICULOPATHY: Primary | ICD-10-CM

## 2022-11-28 PROCEDURE — 99203 OFFICE O/P NEW LOW 30 MIN: CPT | Performed by: FAMILY MEDICINE

## 2022-11-28 ASSESSMENT — PAIN SCALES - GENERAL: PAINLEVEL: MODERATE PAIN (5)

## 2022-11-28 NOTE — LETTER
11/28/2022         RE: Jorge Mahajan  [id number] [facility] [address]        Dear Colleague,    Thank you for referring your patient, Jorge Mahajan, to the [facility] [place]. Please see a copy of my visit note below.    CHIEF COMPLAINT:  Pain of the Lower Back       HISTORY OF PRESENT ILLNESS  Ms. Mahajan is a pleasant 42 year old female who presents to clinic today with low back pain.  Jorge has a history of low back pain, she was last seen for this in March 2019.  She was prescribed gabapentin in the past, this has helped quite a bit over the years.  She has been doing about the same, she experiences waxing and waning pain with various activities and that might worsen throughout the day, if she has a particularly difficult day.  Overall she is doing well.  She does experience pain that radiates down both legs now, as opposed to her left leg only.        Additional history: as documented    MEDICAL HISTORY  Patient Active Problem List   Diagnosis     Cervical cancer screening     Acute low back pain with right-sided sciatica     Degeneration of lumbar or lumbosacral intervertebral disc       Current Outpatient Medications   Medication Sig Dispense Refill     gabapentin (NEURONTIN) 300 MG capsule Take 1 capsule (300 mg) by mouth 3 times daily 270 capsule 3     gabapentin (NEURONTIN) 300 MG capsule Take 1 capsule (300 mg) by mouth 3 times daily 270 capsule 3     gabapentin (NEURONTIN) 300 MG capsule Take 1 capsule (300 Mg) by mouth 3 times a day. 270 capsule 1     amoxicillin (AMOXIL) 500 MG capsule Take 1 capsule (500 mg) by mouth 2 times daily (Patient not taking: Reported on 11/28/2022) 20 capsule 0     prochlorperazine (COMPAZINE) 10 MG tablet Take 10 mg by mouth (Patient not taking: Reported on 11/28/2022)       triamcinolone (KENALOG) 0.5 % cream KLEVER EXT AA BID (Patient not taking: Reported on 11/28/2022)  0       No Known Allergies    Family History   Problem  Relation Age of Onset     Diabetes Father         TYPE II     Family History Negative Other        Additional medical/Social/Surgical histories reviewed in EPIC and updated as appropriate.        PHYSICAL EXAM  General  - normal appearance, in no obvious distress  Musculoskeletal - lumbar spine  - stance: normal gait without limp  - inspection: normal bone and joint alignment, no obvious scoliosis  - palpation: no paravertebral or bony tenderness  - ROM: normal flexion, extension, sidebending, rotation  - strength: lower extremities 5/5 in all planes  - special tests:  (-) slump test bilaterally  Neuro  - patellar and Achilles DTRs 2+ bilaterally, grossly normal coordination, normal muscle tone             ASSESSMENT & PLAN  Ms. Mahajan is a 42 year old female who presents to clinic today with low back pain with radicular features.    I reviewed her previous MRI the room with her, this is from 2017 and does reveal a large disc bulge at L5-S1 with displacement of the left S1 nerve root.    I am happy that she is doing relatively well.  We did discuss the utility of physical therapy activities, taking her gabapentin, anti-inflammatories, and a relative rest, all of which she is engaging in.    Right now she is happy with how she feels, although if she does worsen or if her symptoms are extended I would consider ordering a repeat MRI.  For now I do think we can refill her gabapentin as needed and follow-up in a year.    It was a pleasure seeing Geremit today.    Jaren Farrar DO, [facility]  Primary Care Sports Medicine      This note was constructed using Dragon dictation software, please excuse any minor errors in spelling, grammar, or syntax.        Again, thank you for allowing me to participate in the care of your patient.        Sincerely,        Jaren Farrar DO

## 2022-11-28 NOTE — NURSING NOTE
Chief Complaint   Patient presents with     Lower Back - Pain       There were no vitals filed for this visit.    There is no height or weight on file to calculate BMI.      CHANDRA Green NREMT

## 2022-11-28 NOTE — PROGRESS NOTES
CHIEF COMPLAINT:  Pain of the Lower Back       HISTORY OF PRESENT ILLNESS  Ms. Mahajan is a pleasant 42 year old female who presents to clinic today with low back pain.  Jorge has a history of low back pain, she was last seen for this in March 2019.  She was prescribed gabapentin in the past, this has helped quite a bit over the years.  She has been doing about the same, she experiences waxing and waning pain with various activities and that might worsen throughout the day, if she has a particularly difficult day.  Overall she is doing well.  She does experience pain that radiates down both legs now, as opposed to her left leg only.        Additional history: as documented    MEDICAL HISTORY  Patient Active Problem List   Diagnosis     Cervical cancer screening     Acute low back pain with right-sided sciatica     Degeneration of lumbar or lumbosacral intervertebral disc       Current Outpatient Medications   Medication Sig Dispense Refill     gabapentin (NEURONTIN) 300 MG capsule Take 1 capsule (300 mg) by mouth 3 times daily 270 capsule 3     gabapentin (NEURONTIN) 300 MG capsule Take 1 capsule (300 mg) by mouth 3 times daily 270 capsule 3     gabapentin (NEURONTIN) 300 MG capsule Take 1 capsule (300 Mg) by mouth 3 times a day. 270 capsule 1     amoxicillin (AMOXIL) 500 MG capsule Take 1 capsule (500 mg) by mouth 2 times daily (Patient not taking: Reported on 11/28/2022) 20 capsule 0     prochlorperazine (COMPAZINE) 10 MG tablet Take 10 mg by mouth (Patient not taking: Reported on 11/28/2022)       triamcinolone (KENALOG) 0.5 % cream KLEVER EXT AA BID (Patient not taking: Reported on 11/28/2022)  0       No Known Allergies    Family History   Problem Relation Age of Onset     Diabetes Father         TYPE II     Family History Negative Other        Additional medical/Social/Surgical histories reviewed in EPIC and updated as appropriate.        PHYSICAL EXAM  General  - normal appearance, in no obvious  distress  Musculoskeletal - lumbar spine  - stance: normal gait without limp  - inspection: normal bone and joint alignment, no obvious scoliosis  - palpation: no paravertebral or bony tenderness  - ROM: normal flexion, extension, sidebending, rotation  - strength: lower extremities 5/5 in all planes  - special tests:  (-) slump test bilaterally  Neuro  - patellar and Achilles DTRs 2+ bilaterally, grossly normal coordination, normal muscle tone             ASSESSMENT & PLAN  Ms. Mahajan is a 42 year old female who presents to clinic today with low back pain with radicular features.    I reviewed her previous MRI the room with her, this is from 2017 and does reveal a large disc bulge at L5-S1 with displacement of the left S1 nerve root.    I am happy that she is doing relatively well.  We did discuss the utility of physical therapy activities, taking her gabapentin, anti-inflammatories, and a relative rest, all of which she is engaging in.    Right now she is happy with how she feels, although if she does worsen or if her symptoms are extended I would consider ordering a repeat MRI.  For now I do think we can refill her gabapentin as needed and follow-up in a year.    It was a pleasure seeing Jorge today.    Jaren Farrar DO, [facility]  Primary Care Sports Medicine      This note was constructed using Dragon dictation software, please excuse any minor errors in spelling, grammar, or syntax.

## 2023-12-14 ENCOUNTER — OFFICE VISIT (OUTPATIENT)
Dept: ORTHOPEDICS | Facility: CLINIC | Age: 43
End: 2023-12-14
Payer: COMMERCIAL

## 2023-12-14 DIAGNOSIS — M54.16 LUMBAR RADICULOPATHY: ICD-10-CM

## 2023-12-14 PROCEDURE — 99214 OFFICE O/P EST MOD 30 MIN: CPT | Performed by: FAMILY MEDICINE

## 2023-12-14 RX ORDER — GABAPENTIN 300 MG/1
300 CAPSULE ORAL 3 TIMES DAILY
Qty: 270 CAPSULE | Refills: 3 | Status: SHIPPED | OUTPATIENT
Start: 2023-12-14

## 2023-12-14 ASSESSMENT — PAIN SCALES - GENERAL: PAINLEVEL: MILD PAIN (3)

## 2023-12-14 NOTE — NURSING NOTE
Chief Complaint   Patient presents with    Lower Back - Pain, Follow Up       There were no vitals filed for this visit.    There is no height or weight on file to calculate BMI.      CHANDRA Green NREMT

## 2023-12-14 NOTE — PROGRESS NOTES
HISTORY OF PRESENT ILLNESS  Ms. Mahajan is a pleasant 43 year old female following up with low back pain.  Jorge last saw me just about a year ago for her back.  She does have a history of back pain with radicular features.  At our last visit we revisited the utility of physical therapy, gabapentin, anti-inflammatories, and relative rest, all of which she has been engaging in over the past year.  She does have back pain on occasion, as well as right leg pain and calf tightness.  However, this is tolerable.       PHYSICAL EXAM  General  - normal appearance, in no obvious distress    Musculoskeletal - lumbar spine  - stance: normal gait without limp  - inspection: normal bone and joint alignment, no obvious scoliosis  - palpation: no paravertebral or bony tenderness  - ROM: normal and painless flexion, extension, left and right sidebending and rotation  - strength: lower extremities 5/5 in all planes  - special tests:  (-) straight leg raise bilaterally  Neuro  - patellar and Achilles DTRs 2+ bilaterally, no sensory or motor deficit, grossly normal coordination, normal muscle tone        ASSESSMENT & PLAN  Ms. Mahajan is a 43 year old female following up with low back pain with radicular features.    Her previous MRI is from 2017 and does reveal a large disc bulge at L5-S1 with displacement of the left S1 nerve root.    Given her reassuring clinical picture I do think it would be most reasonable to continue conservative care.  I am also refilling her gabapentin for her, as this is helping quite a bit.    We should follow-up in a year to revisit.    It was a pleasure seeing Jorge.        Jaren Farrar DO, CAQSM      This note was constructed using Dragon dictation software, please excuse any minor errors in spelling, grammar, or syntax.

## 2023-12-14 NOTE — LETTER
12/14/2023         RE: Jorge Mahajan  [id number] [facility] [address]        Dear Colleague,    Thank you for referring your patient, Jorge Mahajan, to the [facility] [place]. Please see a copy of my visit note below.    HISTORY OF PRESENT ILLNESS  Ms. Mahajan is a pleasant 43 year old female following up with low back pain.  Jorge last saw me just about a year ago for her back.  She does have a history of back pain with radicular features.  At our last visit we revisited the utility of physical therapy, gabapentin, anti-inflammatories, and relative rest, all of which she has been engaging in over the past year.  She does have back pain on occasion, as well as right leg pain and calf tightness.  However, this is tolerable.       PHYSICAL EXAM  General  - normal appearance, in no obvious distress    Musculoskeletal - lumbar spine  - stance: normal gait without limp  - inspection: normal bone and joint alignment, no obvious scoliosis  - palpation: no paravertebral or bony tenderness  - ROM: normal and painless flexion, extension, left and right sidebending and rotation  - strength: lower extremities 5/5 in all planes  - special tests:  (-) straight leg raise bilaterally  Neuro  - patellar and Achilles DTRs 2+ bilaterally, no sensory or motor deficit, grossly normal coordination, normal muscle tone        ASSESSMENT & PLAN  Ms. Mahajan is a 43 year old female following up with low back pain with radicular features.    Her previous MRI is from 2017 and does reveal a large disc bulge at L5-S1 with displacement of the left S1 nerve root.    Given her reassuring clinical picture I do think it would be most reasonable to continue conservative care.  I am also refilling her gabapentin for her, as this is helping quite a bit.    We should follow-up in a year to revisit.    It was a pleasure seeing Jorge.        Jaren Farrar, , CAQSM      This note was constructed using Dragon  dictation software, please excuse any minor errors in spelling, grammar, or syntax.      Again, thank you for allowing me to participate in the care of your patient.        Sincerely,        Jaren Farrar, DO

## 2024-11-19 ENCOUNTER — OFFICE VISIT (OUTPATIENT)
Dept: ORTHOPEDICS | Facility: CLINIC | Age: 44
End: 2024-11-19
Payer: COMMERCIAL

## 2024-11-19 DIAGNOSIS — M54.12 CERVICAL RADICULOPATHY: ICD-10-CM

## 2024-11-19 DIAGNOSIS — M54.16 LUMBAR RADICULOPATHY: ICD-10-CM

## 2024-11-19 PROCEDURE — 99214 OFFICE O/P EST MOD 30 MIN: CPT | Performed by: FAMILY MEDICINE

## 2024-11-19 RX ORDER — GABAPENTIN 600 MG/1
600 TABLET ORAL AT BEDTIME
Qty: 90 TABLET | Refills: 3 | Status: SHIPPED | OUTPATIENT
Start: 2024-11-19

## 2024-11-19 ASSESSMENT — PAIN SCALES - GENERAL: PAINLEVEL_OUTOF10: SEVERE PAIN (7)

## 2024-11-19 NOTE — LETTER
11/19/2024      Jorge Mahajan  [id number]  [place] [address]      Dear Colleague,    Thank you for referring your patient, Jorge Mahajan, to the [facility] [place]. Please see a copy of my visit note below.    HISTORY OF PRESENT ILLNESS  Ms. Mahajan is a pleasant 44 year old female following up with back pain.  Jorge last saw me for her lumbar spine and just under a year ago.  At that visit we refilled her gabapentin.  Since that time she has been experiencing waxing and waning pain, although this has gotten worse  over the past week after doing outdoor work around her house.  She points to her mid back at the thoracolumbar junction pain radiates down into her lumbar spine.  She has no lani radicular issues right now, although this has been an issue for her in the past.  She has continued to take gabapentin, she takes 600 mg nightly.  This works very well for her.     PHYSICAL EXAM  Musculoskeletal - lumbar spine  - stance: normal gait without limp  - inspection: normal bone and joint alignment, no obvious scoliosis  - palpation: Tender midline and paraspinal muscles throughout lumbar spine  - ROM: normal and painless flexion, extension, left and right sidebending and rotation  - strength: lower extremities 5/5 in all planes  - special tests:  (-) straight leg raise bilaterally  Neuro  - no sensory or motor deficit, grossly normal coordination, normal muscle tone      ASSESSMENT & PLAN  Ms. Mahajan is a 44 year old female following up with back pain.    I reviewed her previous MRI, this is from 2017 and does reveal a large disc bulge at L5-S1 with displacement of the left S1 nerve root.    We revisited her symptoms and treatment plan.  I am replacing her 300 mg 3 times daily dose of gabapentin with 600 mg at night once, to reflect how she is taking this.    She is going to continue her home exercise plan, which she has been doing.  In the future we could consider repeating  her imaging if her symptoms are worsening.    It was a pleasure seeing Geremit.        Jaren Farrar DO, CAQSM      This note was constructed using Dragon dictation software, please excuse any minor errors in spelling, grammar, or syntax.      Again, thank you for allowing me to participate in the care of your patient.        Sincerely,        Jaren Farrar DO

## 2024-11-19 NOTE — NURSING NOTE
Chief Complaint   Patient presents with    Lower Back - Pain, Follow Up     Low back pain       There were no vitals filed for this visit.    There is no height or weight on file to calculate BMI.      CHANDRA Green NREMT

## 2024-11-19 NOTE — PROGRESS NOTES
HISTORY OF PRESENT ILLNESS  Ms. Mahajan is a pleasant 44 year old female following up with back pain.  Jorge last saw me for her lumbar spine and just under a year ago.  At that visit we refilled her gabapentin.  Since that time she has been experiencing waxing and waning pain, although this has gotten worse  over the past week after doing outdoor work around her house.  She points to her mid back at the thoracolumbar junction pain radiates down into her lumbar spine.  She has no lani radicular issues right now, although this has been an issue for her in the past.  She has continued to take gabapentin, she takes 600 mg nightly.  This works very well for her.     PHYSICAL EXAM  Musculoskeletal - lumbar spine  - stance: normal gait without limp  - inspection: normal bone and joint alignment, no obvious scoliosis  - palpation: Tender midline and paraspinal muscles throughout lumbar spine  - ROM: normal and painless flexion, extension, left and right sidebending and rotation  - strength: lower extremities 5/5 in all planes  - special tests:  (-) straight leg raise bilaterally  Neuro  - no sensory or motor deficit, grossly normal coordination, normal muscle tone      ASSESSMENT & PLAN  Ms. Mahajan is a 44 year old female following up with back pain.    I reviewed her previous MRI, this is from 2017 and does reveal a large disc bulge at L5-S1 with displacement of the left S1 nerve root.    We revisited her symptoms and treatment plan.  I am replacing her 300 mg 3 times daily dose of gabapentin with 600 mg at night once, to reflect how she is taking this.    She is going to continue her home exercise plan, which she has been doing.  In the future we could consider repeating her imaging if her symptoms are worsening.    It was a pleasure seeing Jorge.        Jaren Farrar, DO, CAQSM      This note was constructed using Dragon dictation software, please excuse any minor errors in spelling, grammar, or syntax.